# Patient Record
Sex: MALE | Race: WHITE | NOT HISPANIC OR LATINO | Employment: FULL TIME | RURAL
[De-identification: names, ages, dates, MRNs, and addresses within clinical notes are randomized per-mention and may not be internally consistent; named-entity substitution may affect disease eponyms.]

---

## 2017-01-01 ENCOUNTER — OFFICE VISIT (OUTPATIENT)
Dept: RETAIL CLINIC | Facility: CLINIC | Age: 67
End: 2017-01-01

## 2017-01-01 VITALS
RESPIRATION RATE: 20 BRPM | WEIGHT: 241.6 LBS | SYSTOLIC BLOOD PRESSURE: 140 MMHG | BODY MASS INDEX: 37.92 KG/M2 | HEIGHT: 67 IN | TEMPERATURE: 99 F | DIASTOLIC BLOOD PRESSURE: 80 MMHG | HEART RATE: 99 BPM | OXYGEN SATURATION: 96 %

## 2017-01-01 DIAGNOSIS — J40 BRONCHITIS: Primary | ICD-10-CM

## 2017-01-01 PROCEDURE — 99213 OFFICE O/P EST LOW 20 MIN: CPT | Performed by: NURSE PRACTITIONER

## 2017-01-01 RX ORDER — HYDRALAZINE HYDROCHLORIDE 50 MG/1
50 TABLET, FILM COATED ORAL 3 TIMES DAILY
COMMUNITY

## 2017-01-01 RX ORDER — TRAZODONE HYDROCHLORIDE 50 MG/1
50 TABLET ORAL NIGHTLY
COMMUNITY

## 2017-01-01 RX ORDER — AMLODIPINE BESYLATE 10 MG/1
10 TABLET ORAL DAILY
COMMUNITY
End: 2017-07-22

## 2017-01-01 RX ORDER — FINASTERIDE 5 MG/1
5 TABLET, FILM COATED ORAL
COMMUNITY
End: 2017-07-22

## 2017-01-01 RX ORDER — CARVEDILOL 25 MG/1
25 TABLET ORAL 2 TIMES DAILY WITH MEALS
COMMUNITY

## 2017-01-01 RX ORDER — CLONIDINE HYDROCHLORIDE 0.1 MG/1
0.2 TABLET ORAL 2 TIMES DAILY
COMMUNITY
End: 2018-01-22

## 2017-01-01 RX ORDER — METHYLPREDNISOLONE 4 MG/1
TABLET ORAL
Qty: 21 TABLET | Refills: 0 | Status: SHIPPED | OUTPATIENT
Start: 2017-01-01 | End: 2017-07-22

## 2017-01-01 RX ORDER — TAMSULOSIN HYDROCHLORIDE 0.4 MG/1
0.4 CAPSULE ORAL
COMMUNITY
End: 2017-07-22

## 2017-01-01 RX ORDER — GABAPENTIN 300 MG/1
300 CAPSULE ORAL 3 TIMES DAILY
COMMUNITY
End: 2018-01-25 | Stop reason: HOSPADM

## 2017-01-01 RX ORDER — POTASSIUM CHLORIDE 750 MG/1
10 TABLET, FILM COATED, EXTENDED RELEASE ORAL DAILY
COMMUNITY
End: 2018-01-25 | Stop reason: HOSPADM

## 2017-01-01 RX ORDER — LISINOPRIL 40 MG/1
20 TABLET ORAL
COMMUNITY

## 2017-01-01 RX ORDER — BUSPIRONE HYDROCHLORIDE 15 MG/1
15 TABLET ORAL
COMMUNITY

## 2017-01-01 RX ORDER — AMOXICILLIN AND CLAVULANATE POTASSIUM 875; 125 MG/1; MG/1
1 TABLET, FILM COATED ORAL 2 TIMES DAILY
Qty: 20 TABLET | Refills: 0 | Status: SHIPPED | OUTPATIENT
Start: 2017-01-01 | End: 2017-01-11

## 2017-01-01 RX ORDER — ATORVASTATIN CALCIUM 20 MG/1
TABLET, FILM COATED ORAL DAILY
Status: ON HOLD | COMMUNITY
End: 2018-01-25

## 2017-01-01 RX ORDER — HYDROCHLOROTHIAZIDE 12.5 MG/1
12.5 CAPSULE, GELATIN COATED ORAL
COMMUNITY
End: 2017-07-22

## 2017-01-01 RX ORDER — FLURAZEPAM HCL 15 MG
15 CAPSULE ORAL DAILY
COMMUNITY
End: 2017-07-22

## 2017-01-01 RX ORDER — LISINOPRIL 40 MG/1
40 TABLET ORAL DAILY
COMMUNITY
End: 2017-07-22

## 2017-01-01 RX ORDER — FUROSEMIDE 20 MG/1
20 TABLET ORAL DAILY
COMMUNITY
End: 2018-01-25 | Stop reason: HOSPADM

## 2017-01-01 RX ORDER — BROMPHENIRAMINE MALEATE, PSEUDOEPHEDRINE HYDROCHLORIDE, AND DEXTROMETHORPHAN HYDROBROMIDE 2; 30; 10 MG/5ML; MG/5ML; MG/5ML
5-10 SYRUP ORAL 4 TIMES DAILY PRN
Qty: 120 ML | Refills: 0 | Status: SHIPPED | OUTPATIENT
Start: 2017-01-01 | End: 2017-07-22

## 2017-01-01 NOTE — PROGRESS NOTES
Subjective   Huber Gomez is a 66 y.o. male.     Cough   This is a new problem. The current episode started in the past 7 days. The problem has been gradually worsening. The problem occurs every few hours. The cough is productive of purulent sputum. Associated symptoms include nasal congestion, postnasal drip and a sore throat. The symptoms are aggravated by exercise. The treatment provided no relief.        The following portions of the patient's history were reviewed and updated as appropriate: allergies, current medications, past family history, past medical history, past social history, past surgical history and problem list.  ...    Review of Systems   Constitutional: Negative.    HENT: Positive for postnasal drip and sore throat.    Eyes: Negative.    Respiratory: Positive for cough.    Cardiovascular: Negative.    Gastrointestinal: Negative.    Endocrine: Negative.    Genitourinary: Negative.    Musculoskeletal: Negative.    Skin: Negative.    Allergic/Immunologic: Negative.    Neurological: Negative.    Hematological: Negative.    Psychiatric/Behavioral: Negative.        Objective   Physical Exam   Constitutional: He is oriented to person, place, and time. He appears well-nourished.   HENT:   Head: Normocephalic and atraumatic.   Right Ear: External ear normal.   Left Ear: External ear normal.   Mouth/Throat: Uvula is midline and mucous membranes are normal. Oropharyngeal exudate and posterior oropharyngeal erythema present. Tonsils are 1+ on the right. Tonsils are 1+ on the left.   Eyes: Conjunctivae are normal. Pupils are equal, round, and reactive to light.   Neck: Neck supple.   Cardiovascular: Normal rate and regular rhythm.    Pulmonary/Chest:   Coarse breath sounds bilaterally   Abdominal: Soft. Bowel sounds are normal.   Musculoskeletal: Normal range of motion.   Neurological: He is alert and oriented to person, place, and time.   Skin: Skin is warm and dry.   Psychiatric: He has a normal mood and  affect. His behavior is normal.   Nursing note and vitals reviewed.      Assessment/Plan   Huber was seen today for cough.    Diagnoses and all orders for this visit:    Bronchitis    Other orders  -     amoxicillin-clavulanate (AUGMENTIN) 875-125 MG per tablet; Take 1 tablet by mouth 2 (Two) Times a Day for 10 days.  -     MethylPREDNISolone (MEDROL, ASHLEY,) 4 MG tablet; Take as directed on package instructions.  -     brompheniramine-pseudoephedrine-DM 30-2-10 MG/5ML syrup; Take 5-10 mL by mouth 4 (Four) Times a Day As Needed for cough.

## 2017-01-01 NOTE — MR AVS SNAPSHOT
Huber CAMILO Jason   1/1/2017 12:15 PM   Office Visit    Dept Phone:  [phone]   Encounter #:  [id number]    Provider:  ZULY MATOS   Department:  [facility]                Your Full Care Plan              Today's Medication Changes          These changes are accurate as of: 1/1/17 12:56 PM.  If you have any questions, ask your nurse or doctor.               New Medication(s)Ordered:     amoxicillin-clavulanate 875-125 MG per tablet   Commonly known as:  AUGMENTIN   Take 1 tablet by mouth 2 (Two) Times a Day for 10 days.       brompheniramine-pseudoephedrine-DM 30-2-10 MG/5ML syrup   Take 5-10 mL by mouth 4 (Four) Times a Day As Needed for cough.       MethylPREDNISolone 4 MG tablet   Commonly known as:  MEDROL (ASHLEY)   Take as directed on package instructions.            Where to Get Your Medications      These medications were sent to [facility] [address] [place] [address] [employer] [facility] - [phone]  - [phone] [facility] [place] [facility] [address]     Phone:  [phone]     amoxicillin-clavulanate 875-125 MG per tablet    brompheniramine-pseudoephedrine-DM 30-2-10 MG/5ML syrup    MethylPREDNISolone 4 MG tablet                  Your Updated Medication List          This list is accurate as of: 1/1/17 12:56 PM.  Always use your most recent med list.                amLODIPine 10 MG tablet   Commonly known as:  NORVASC       amoxicillin-clavulanate 875-125 MG per tablet   Commonly known as:  AUGMENTIN   Take 1 tablet by mouth 2 (Two) Times a Day for 10 days.       ASPIRIN PO       atorvastatin 20 MG tablet   Commonly known as:  LIPITOR       brompheniramine-pseudoephedrine-DM 30-2-10 MG/5ML syrup   Take 5-10 mL by mouth 4 (Four) Times a Day As Needed for cough.       busPIRone 15 MG tablet   Commonly known as:  BUSPAR       carvedilol 25 MG tablet   Commonly known as:  COREG       CloNIDine 0.1 MG tablet   Commonly known as:  CATAPRES       finasteride  5 MG tablet   Commonly known as:  PROSCAR       flurazepam 15 MG capsule   Commonly known as:  DALMANE       furosemide 20 MG tablet   Commonly known as:  LASIX       gabapentin 300 MG capsule   Commonly known as:  NEURONTIN       hydrALAZINE 50 MG tablet   Commonly known as:  APRESOLINE       hydrochlorothiazide 12.5 MG capsule   Commonly known as:  MICROZIDE       HYDROcodone-acetaminophen 7.5-500 MG per tablet   Commonly known as:  VICODIN       * lisinopril 40 MG tablet   Commonly known as:  PRINIVIL,ZESTRIL       * lisinopril 40 MG tablet   Commonly known as:  PRINIVIL,ZESTRIL       MethylPREDNISolone 4 MG tablet   Commonly known as:  MEDROL (ASHLEY)   Take as directed on package instructions.       niacin-simvastatin 500-20 MG per 24 hr tablet   Commonly known as:  SIMCOR       potassium chloride 10 MEQ CR tablet   Commonly known as:  K-DUR       tamsulosin 0.4 MG capsule 24 hr capsule   Commonly known as:  FLOMAX       traZODone 50 MG tablet   Commonly known as:  DESYREL       * Notice:  This list has 2 medication(s) that are the same as other medications prescribed for you. Read the directions carefully, and ask your doctor or other care provider to review them with you.            You Were Diagnosed With        Codes Comments    Bronchitis    -  Primary ICD-10-CM: J40  ICD-9-CM: 490       Instructions    Follow up with family physician failure to improve or worsening condition.     Patient Instructions History      Upcoming Appointments     Visit Type Date Time Department    OFFICE VISIT 1/1/2017 12:15 PM GAURAV POE      [facility] Signup     [facility] allows you to send messages to your doctor, view your test results, renew your prescriptions, schedule appointments, and more. To sign up, go to [employer] and click on the Sign Up Now link in the New User? box. Enter your [employer] Activation Code exactly as it appears below along with the last four digits of your Social Security  "Number and your Date of Birth () to complete the sign-up process. If you do not sign up before the expiration date, you must request a new code.    [employer] Activation Code: [email or web address]  Expires: 1/15/2017 12:56 PM    If you have questions, you can email [email or web address] or call [phone] to talk to our [employer] staff. Remember, [employer] is NOT to be used for urgent needs. For medical emergencies, dial 911.               Other Info from Your Visit           Allergies     No Known Allergies      Reason for Visit     Cough sneezing,sore throat x 3 days      Vital Signs     Blood Pressure Pulse Temperature Respirations Height Weight    140/80 (BP Location: Right arm, Patient Position: Sitting, Cuff Size: Large Adult) 99 99 °F (37.2 °C) (Tympanic) 20 67\" (170.2 cm) 241 lb 9.6 oz (110 kg)    Oxygen Saturation Body Mass Index Smoking Status             96% 37.84 kg/m2 Never Smoker         Problems and Diagnoses Noted     Bronchitis    -  Primary        "

## 2017-02-12 PROBLEM — M54.2 NECK PAIN: Status: ACTIVE | Noted: 2017-02-12

## 2018-01-22 ENCOUNTER — APPOINTMENT (OUTPATIENT)
Dept: GENERAL RADIOLOGY | Facility: HOSPITAL | Age: 68
End: 2018-01-22

## 2018-01-22 ENCOUNTER — HOSPITAL ENCOUNTER (OUTPATIENT)
Facility: HOSPITAL | Age: 68
Setting detail: OBSERVATION
Discharge: HOME | End: 2018-01-25
Attending: FAMILY MEDICINE | Admitting: FAMILY MEDICINE

## 2018-01-22 DIAGNOSIS — R07.2 PRECORDIAL PAIN: Primary | ICD-10-CM

## 2018-01-22 DIAGNOSIS — I10 ESSENTIAL HYPERTENSION: ICD-10-CM

## 2018-01-22 DIAGNOSIS — I10 RESISTANT HYPERTENSION: ICD-10-CM

## 2018-01-22 LAB
ALBUMIN SERPL-MCNC: 4.1 G/DL (ref 3.4–4.8)
ALBUMIN/GLOB SERPL: 1.2 G/DL (ref 1.1–1.8)
ALP SERPL-CCNC: 78 U/L (ref 38–126)
ALT SERPL W P-5'-P-CCNC: 42 U/L (ref 21–72)
ANION GAP SERPL CALCULATED.3IONS-SCNC: 12 MMOL/L (ref 5–15)
AST SERPL-CCNC: 29 U/L (ref 17–59)
BASOPHILS # BLD AUTO: 0.05 10*3/MM3 (ref 0–0.2)
BASOPHILS NFR BLD AUTO: 0.5 % (ref 0–2)
BILIRUB SERPL-MCNC: 0.5 MG/DL (ref 0.2–1.3)
BUN BLD-MCNC: 23 MG/DL (ref 7–21)
BUN/CREAT SERPL: 17.6 (ref 7–25)
CALCIUM SPEC-SCNC: 9.1 MG/DL (ref 8.4–10.2)
CHLORIDE SERPL-SCNC: 103 MMOL/L (ref 95–110)
CK MB SERPL-CCNC: 1.72 NG/ML (ref 0–5)
CK SERPL-CCNC: 91 U/L (ref 55–170)
CO2 SERPL-SCNC: 22 MMOL/L (ref 22–31)
CREAT BLD-MCNC: 1.31 MG/DL (ref 0.7–1.3)
DEPRECATED RDW RBC AUTO: 38.7 FL (ref 35.1–43.9)
EOSINOPHIL # BLD AUTO: 0.21 10*3/MM3 (ref 0–0.7)
EOSINOPHIL NFR BLD AUTO: 2.3 % (ref 0–7)
ERYTHROCYTE [DISTWIDTH] IN BLOOD BY AUTOMATED COUNT: 12.9 % (ref 11.5–14.5)
GFR SERPL CREATININE-BSD FRML MDRD: 55 ML/MIN/1.73 (ref 60–113)
GLOBULIN UR ELPH-MCNC: 3.3 GM/DL (ref 2.3–3.5)
GLUCOSE BLD-MCNC: 115 MG/DL (ref 60–100)
HCT VFR BLD AUTO: 44.2 % (ref 39–49)
HGB BLD-MCNC: 15.3 G/DL (ref 13.7–17.3)
HOLD SPECIMEN: NORMAL
HOLD SPECIMEN: NORMAL
IMM GRANULOCYTES # BLD: 0.05 10*3/MM3 (ref 0–0.02)
IMM GRANULOCYTES NFR BLD: 0.5 % (ref 0–0.5)
INR PPP: 0.9 (ref 0.8–1.2)
LIPASE SERPL-CCNC: 184 U/L (ref 23–300)
LYMPHOCYTES # BLD AUTO: 2.82 10*3/MM3 (ref 0.6–4.2)
LYMPHOCYTES NFR BLD AUTO: 30.5 % (ref 10–50)
MCH RBC QN AUTO: 28.9 PG (ref 26.5–34)
MCHC RBC AUTO-ENTMCNC: 34.6 G/DL (ref 31.5–36.3)
MCV RBC AUTO: 83.6 FL (ref 80–98)
MONOCYTES # BLD AUTO: 0.97 10*3/MM3 (ref 0–0.9)
MONOCYTES NFR BLD AUTO: 10.5 % (ref 0–12)
NEUTROPHILS # BLD AUTO: 5.16 10*3/MM3 (ref 2–8.6)
NEUTROPHILS NFR BLD AUTO: 55.7 % (ref 37–80)
NT-PROBNP SERPL-MCNC: 272 PG/ML (ref 0–900)
PLATELET # BLD AUTO: 232 10*3/MM3 (ref 150–450)
PMV BLD AUTO: 11.1 FL (ref 8–12)
POTASSIUM BLD-SCNC: 3.6 MMOL/L (ref 3.5–5.1)
PROT SERPL-MCNC: 7.4 G/DL (ref 6.3–8.6)
PROTHROMBIN TIME: 12 SECONDS (ref 11.1–15.3)
RBC # BLD AUTO: 5.29 10*6/MM3 (ref 4.37–5.74)
SODIUM BLD-SCNC: 137 MMOL/L (ref 137–145)
TROPONIN I SERPL-MCNC: 0.02 NG/ML
WBC NRBC COR # BLD: 9.26 10*3/MM3 (ref 3.2–9.8)
WHOLE BLOOD HOLD SPECIMEN: NORMAL
WHOLE BLOOD HOLD SPECIMEN: NORMAL

## 2018-01-22 PROCEDURE — 93005 ELECTROCARDIOGRAM TRACING: CPT | Performed by: FAMILY MEDICINE

## 2018-01-22 PROCEDURE — 85025 COMPLETE CBC W/AUTO DIFF WBC: CPT | Performed by: FAMILY MEDICINE

## 2018-01-22 PROCEDURE — 96376 TX/PRO/DX INJ SAME DRUG ADON: CPT

## 2018-01-22 PROCEDURE — 25010000002 KETOROLAC TROMETHAMINE PER 15 MG: Performed by: FAMILY MEDICINE

## 2018-01-22 PROCEDURE — 96374 THER/PROPH/DIAG INJ IV PUSH: CPT

## 2018-01-22 PROCEDURE — 83880 ASSAY OF NATRIURETIC PEPTIDE: CPT | Performed by: FAMILY MEDICINE

## 2018-01-22 PROCEDURE — 71046 X-RAY EXAM CHEST 2 VIEWS: CPT

## 2018-01-22 PROCEDURE — 83690 ASSAY OF LIPASE: CPT | Performed by: FAMILY MEDICINE

## 2018-01-22 PROCEDURE — 80053 COMPREHEN METABOLIC PANEL: CPT | Performed by: FAMILY MEDICINE

## 2018-01-22 PROCEDURE — 82553 CREATINE MB FRACTION: CPT | Performed by: FAMILY MEDICINE

## 2018-01-22 PROCEDURE — 99285 EMERGENCY DEPT VISIT HI MDM: CPT

## 2018-01-22 PROCEDURE — 85610 PROTHROMBIN TIME: CPT | Performed by: FAMILY MEDICINE

## 2018-01-22 PROCEDURE — 84484 ASSAY OF TROPONIN QUANT: CPT | Performed by: FAMILY MEDICINE

## 2018-01-22 PROCEDURE — 96375 TX/PRO/DX INJ NEW DRUG ADDON: CPT

## 2018-01-22 PROCEDURE — G0378 HOSPITAL OBSERVATION PER HR: HCPCS

## 2018-01-22 PROCEDURE — 93010 ELECTROCARDIOGRAM REPORT: CPT | Performed by: INTERNAL MEDICINE

## 2018-01-22 PROCEDURE — 25010000002 HYDRALAZINE PER 20 MG: Performed by: FAMILY MEDICINE

## 2018-01-22 PROCEDURE — 82550 ASSAY OF CK (CPK): CPT | Performed by: FAMILY MEDICINE

## 2018-01-22 RX ORDER — HYDRALAZINE HYDROCHLORIDE 20 MG/ML
20 INJECTION INTRAMUSCULAR; INTRAVENOUS ONCE
Status: COMPLETED | OUTPATIENT
Start: 2018-01-22 | End: 2018-01-22

## 2018-01-22 RX ORDER — KETOROLAC TROMETHAMINE 30 MG/ML
30 INJECTION, SOLUTION INTRAMUSCULAR; INTRAVENOUS ONCE
Status: COMPLETED | OUTPATIENT
Start: 2018-01-22 | End: 2018-01-22

## 2018-01-22 RX ORDER — ASPIRIN 325 MG
325 TABLET ORAL ONCE
Status: COMPLETED | OUTPATIENT
Start: 2018-01-22 | End: 2018-01-22

## 2018-01-22 RX ORDER — SODIUM CHLORIDE 0.9 % (FLUSH) 0.9 %
10 SYRINGE (ML) INJECTION AS NEEDED
Status: DISCONTINUED | OUTPATIENT
Start: 2018-01-22 | End: 2018-01-25 | Stop reason: HOSPADM

## 2018-01-22 RX ADMIN — HYDRALAZINE HYDROCHLORIDE 20 MG: 20 INJECTION INTRAMUSCULAR; INTRAVENOUS at 23:30

## 2018-01-22 RX ADMIN — KETOROLAC TROMETHAMINE 30 MG: 30 INJECTION, SOLUTION INTRAMUSCULAR; INTRAVENOUS at 22:46

## 2018-01-22 RX ADMIN — HYDRALAZINE HYDROCHLORIDE 20 MG: 20 INJECTION INTRAMUSCULAR; INTRAVENOUS at 21:25

## 2018-01-22 RX ADMIN — Medication 10 ML: at 22:47

## 2018-01-22 RX ADMIN — Medication 10 ML: at 21:42

## 2018-01-22 RX ADMIN — ASPIRIN 325 MG: 325 TABLET, COATED ORAL at 21:39

## 2018-01-23 ENCOUNTER — APPOINTMENT (OUTPATIENT)
Dept: CARDIOLOGY | Facility: HOSPITAL | Age: 68
End: 2018-01-23
Attending: FAMILY MEDICINE

## 2018-01-23 PROBLEM — R00.1 SINUS BRADYCARDIA: Status: ACTIVE | Noted: 2018-01-23

## 2018-01-23 PROBLEM — I10 RESISTANT HYPERTENSION: Status: ACTIVE | Noted: 2018-01-23

## 2018-01-23 PROBLEM — I10 HYPERTENSION: Chronic | Status: ACTIVE | Noted: 2018-01-23

## 2018-01-23 LAB
ANION GAP SERPL CALCULATED.3IONS-SCNC: 9 MMOL/L (ref 5–15)
BASOPHILS # BLD AUTO: 0.02 10*3/MM3 (ref 0–0.2)
BASOPHILS NFR BLD AUTO: 0.2 % (ref 0–2)
BH CV ECHO MEAS - ACS: 2.1 CM
BH CV ECHO MEAS - AO ISTHMUS: 2.3 CM
BH CV ECHO MEAS - AO MAX PG (FULL): 3.1 MMHG
BH CV ECHO MEAS - AO MAX PG: 10.8 MMHG
BH CV ECHO MEAS - AO MEAN PG (FULL): 1.3 MMHG
BH CV ECHO MEAS - AO MEAN PG: 6.3 MMHG
BH CV ECHO MEAS - AO ROOT AREA (BSA CORRECTED): 1.3
BH CV ECHO MEAS - AO ROOT AREA: 7.4 CM^2
BH CV ECHO MEAS - AO ROOT DIAM: 3.1 CM
BH CV ECHO MEAS - AO V2 MAX: 164.1 CM/SEC
BH CV ECHO MEAS - AO V2 MEAN: 119.3 CM/SEC
BH CV ECHO MEAS - AO V2 VTI: 34 CM
BH CV ECHO MEAS - ASC AORTA: 2.8 CM
BH CV ECHO MEAS - AVA(I,A): 3.8 CM^2
BH CV ECHO MEAS - AVA(I,D): 3.8 CM^2
BH CV ECHO MEAS - AVA(V,A): 3.3 CM^2
BH CV ECHO MEAS - AVA(V,D): 3.3 CM^2
BH CV ECHO MEAS - BSA(HAYCOCK): 2.4 M^2
BH CV ECHO MEAS - BSA: 2.3 M^2
BH CV ECHO MEAS - BZI_BMI: 41.7 KILOGRAMS/M^2
BH CV ECHO MEAS - BZI_METRIC_HEIGHT: 170.2 CM
BH CV ECHO MEAS - BZI_METRIC_WEIGHT: 120.7 KG
BH CV ECHO MEAS - EDV(CUBED): 122.9 ML
BH CV ECHO MEAS - EDV(TEICH): 116.7 ML
BH CV ECHO MEAS - EF(CUBED): 79.8 %
BH CV ECHO MEAS - EF(TEICH): 72 %
BH CV ECHO MEAS - ESV(CUBED): 24.8 ML
BH CV ECHO MEAS - ESV(TEICH): 32.7 ML
BH CV ECHO MEAS - FS: 41.3 %
BH CV ECHO MEAS - IVS/LVPW: 1.1
BH CV ECHO MEAS - IVSD: 1.1 CM
BH CV ECHO MEAS - LA DIMENSION: 4.2 CM
BH CV ECHO MEAS - LA/AO: 1.4
BH CV ECHO MEAS - LV MASS(C)D: 197.5 GRAMS
BH CV ECHO MEAS - LV MASS(C)DI: 86.5 GRAMS/M^2
BH CV ECHO MEAS - LV MAX PG: 7.6 MMHG
BH CV ECHO MEAS - LV MEAN PG: 4.9 MMHG
BH CV ECHO MEAS - LV V1 MAX: 138.2 CM/SEC
BH CV ECHO MEAS - LV V1 MEAN: 105.6 CM/SEC
BH CV ECHO MEAS - LV V1 VTI: 33.5 CM
BH CV ECHO MEAS - LVIDD: 5 CM
BH CV ECHO MEAS - LVIDS: 2.9 CM
BH CV ECHO MEAS - LVOT AREA (M): 3.8 CM^2
BH CV ECHO MEAS - LVOT AREA: 3.9 CM^2
BH CV ECHO MEAS - LVOT DIAM: 2.2 CM
BH CV ECHO MEAS - LVPWD: 1 CM
BH CV ECHO MEAS - MR MAX PG: 7.8 MMHG
BH CV ECHO MEAS - MR MAX VEL: 139.6 CM/SEC
BH CV ECHO MEAS - MV A MAX VEL: 74.2 CM/SEC
BH CV ECHO MEAS - MV DEC SLOPE: 220 CM/SEC^2
BH CV ECHO MEAS - MV E MAX VEL: 68.4 CM/SEC
BH CV ECHO MEAS - MV E/A: 0.92
BH CV ECHO MEAS - MV MAX PG: 3.1 MMHG
BH CV ECHO MEAS - MV MEAN PG: 1 MMHG
BH CV ECHO MEAS - MV P1/2T MAX VEL: 89.2 CM/SEC
BH CV ECHO MEAS - MV P1/2T: 118.8 MSEC
BH CV ECHO MEAS - MV V2 MAX: 88.2 CM/SEC
BH CV ECHO MEAS - MV V2 MEAN: 46.6 CM/SEC
BH CV ECHO MEAS - MV V2 VTI: 33.5 CM
BH CV ECHO MEAS - MVA P1/2T LCG: 2.5 CM^2
BH CV ECHO MEAS - MVA(P1/2T): 1.9 CM^2
BH CV ECHO MEAS - MVA(VTI): 3.9 CM^2
BH CV ECHO MEAS - PA MAX PG: 3.5 MMHG
BH CV ECHO MEAS - PA V2 MAX: 93.1 CM/SEC
BH CV ECHO MEAS - PI END-D VEL: 66.9 CM/SEC
BH CV ECHO MEAS - RVDD: 2.6 CM
BH CV ECHO MEAS - SI(AO): 110.8 ML/M^2
BH CV ECHO MEAS - SI(CUBED): 43 ML/M^2
BH CV ECHO MEAS - SI(LVOT): 57.1 ML/M^2
BH CV ECHO MEAS - SI(TEICH): 36.8 ML/M^2
BH CV ECHO MEAS - SV(AO): 252.9 ML
BH CV ECHO MEAS - SV(CUBED): 98.1 ML
BH CV ECHO MEAS - SV(LVOT): 130.3 ML
BH CV ECHO MEAS - SV(TEICH): 84 ML
BH CV ECHO MEAS - TR MAX VEL: 171.3 CM/SEC
BUN BLD-MCNC: 21 MG/DL (ref 7–21)
BUN/CREAT SERPL: 17.4 (ref 7–25)
CALCIUM SPEC-SCNC: 9.2 MG/DL (ref 8.4–10.2)
CHLORIDE SERPL-SCNC: 104 MMOL/L (ref 95–110)
CO2 SERPL-SCNC: 24 MMOL/L (ref 22–31)
CREAT BLD-MCNC: 1.21 MG/DL (ref 0.7–1.3)
DEPRECATED RDW RBC AUTO: 39 FL (ref 35.1–43.9)
EOSINOPHIL # BLD AUTO: 0.07 10*3/MM3 (ref 0–0.7)
EOSINOPHIL NFR BLD AUTO: 0.6 % (ref 0–7)
ERYTHROCYTE [DISTWIDTH] IN BLOOD BY AUTOMATED COUNT: 12.9 % (ref 11.5–14.5)
GFR SERPL CREATININE-BSD FRML MDRD: 60 ML/MIN/1.73 (ref 49–113)
GLUCOSE BLD-MCNC: 144 MG/DL (ref 60–100)
HCT VFR BLD AUTO: 42.8 % (ref 39–49)
HGB BLD-MCNC: 14.5 G/DL (ref 13.7–17.3)
HOLD SPECIMEN: NORMAL
IMM GRANULOCYTES # BLD: 0.05 10*3/MM3 (ref 0–0.02)
IMM GRANULOCYTES NFR BLD: 0.5 % (ref 0–0.5)
LV EF 2D ECHO EST: 65 %
LYMPHOCYTES # BLD AUTO: 1.56 10*3/MM3 (ref 0.6–4.2)
LYMPHOCYTES NFR BLD AUTO: 14.1 % (ref 10–50)
MCH RBC QN AUTO: 28.3 PG (ref 26.5–34)
MCHC RBC AUTO-ENTMCNC: 33.9 G/DL (ref 31.5–36.3)
MCV RBC AUTO: 83.6 FL (ref 80–98)
MONOCYTES # BLD AUTO: 0.86 10*3/MM3 (ref 0–0.9)
MONOCYTES NFR BLD AUTO: 7.7 % (ref 0–12)
NEUTROPHILS # BLD AUTO: 8.54 10*3/MM3 (ref 2–8.6)
NEUTROPHILS NFR BLD AUTO: 76.9 % (ref 37–80)
PLATELET # BLD AUTO: 234 10*3/MM3 (ref 150–450)
PMV BLD AUTO: 10.9 FL (ref 8–12)
POTASSIUM BLD-SCNC: 3.6 MMOL/L (ref 3.5–5.1)
RBC # BLD AUTO: 5.12 10*6/MM3 (ref 4.37–5.74)
SODIUM BLD-SCNC: 137 MMOL/L (ref 137–145)
TROPONIN I SERPL-MCNC: 0.02 NG/ML
WBC NRBC COR # BLD: 11.1 10*3/MM3 (ref 3.2–9.8)

## 2018-01-23 PROCEDURE — G0378 HOSPITAL OBSERVATION PER HR: HCPCS

## 2018-01-23 PROCEDURE — 25010000002 ONDANSETRON PER 1 MG: Performed by: FAMILY MEDICINE

## 2018-01-23 PROCEDURE — 85025 COMPLETE CBC W/AUTO DIFF WBC: CPT | Performed by: FAMILY MEDICINE

## 2018-01-23 PROCEDURE — 84484 ASSAY OF TROPONIN QUANT: CPT | Performed by: FAMILY MEDICINE

## 2018-01-23 PROCEDURE — 80048 BASIC METABOLIC PNL TOTAL CA: CPT | Performed by: FAMILY MEDICINE

## 2018-01-23 PROCEDURE — 93306 TTE W/DOPPLER COMPLETE: CPT | Performed by: INTERNAL MEDICINE

## 2018-01-23 PROCEDURE — 94799 UNLISTED PULMONARY SVC/PX: CPT

## 2018-01-23 PROCEDURE — 93306 TTE W/DOPPLER COMPLETE: CPT

## 2018-01-23 PROCEDURE — 94760 N-INVAS EAR/PLS OXIMETRY 1: CPT

## 2018-01-23 PROCEDURE — 96375 TX/PRO/DX INJ NEW DRUG ADDON: CPT

## 2018-01-23 RX ORDER — GABAPENTIN 300 MG/1
300 CAPSULE ORAL 3 TIMES DAILY
Status: DISCONTINUED | OUTPATIENT
Start: 2018-01-23 | End: 2018-01-23

## 2018-01-23 RX ORDER — HYDROCHLOROTHIAZIDE 25 MG/1
50 TABLET ORAL DAILY
Status: DISCONTINUED | OUTPATIENT
Start: 2018-01-23 | End: 2018-01-23

## 2018-01-23 RX ORDER — ATORVASTATIN CALCIUM 20 MG/1
20 TABLET, FILM COATED ORAL DAILY
Status: DISCONTINUED | OUTPATIENT
Start: 2018-01-23 | End: 2018-01-23

## 2018-01-23 RX ORDER — HYDRALAZINE HYDROCHLORIDE 20 MG/ML
5 INJECTION INTRAMUSCULAR; INTRAVENOUS EVERY 6 HOURS PRN
Status: DISCONTINUED | OUTPATIENT
Start: 2018-01-23 | End: 2018-01-25 | Stop reason: HOSPADM

## 2018-01-23 RX ORDER — FUROSEMIDE 20 MG/1
20 TABLET ORAL DAILY
Status: DISCONTINUED | OUTPATIENT
Start: 2018-01-23 | End: 2018-01-23

## 2018-01-23 RX ORDER — HYDRALAZINE HYDROCHLORIDE 50 MG/1
50 TABLET, FILM COATED ORAL 3 TIMES DAILY
Status: DISCONTINUED | OUTPATIENT
Start: 2018-01-23 | End: 2018-01-23

## 2018-01-23 RX ORDER — CLONIDINE HYDROCHLORIDE 0.2 MG/1
0.2 TABLET ORAL EVERY 8 HOURS SCHEDULED
Status: DISCONTINUED | OUTPATIENT
Start: 2018-01-23 | End: 2018-01-25 | Stop reason: HOSPADM

## 2018-01-23 RX ORDER — ACETAMINOPHEN 325 MG/1
650 TABLET ORAL EVERY 4 HOURS PRN
Status: DISCONTINUED | OUTPATIENT
Start: 2018-01-23 | End: 2018-01-25 | Stop reason: HOSPADM

## 2018-01-23 RX ORDER — LISINOPRIL 40 MG/1
40 TABLET ORAL
Status: DISCONTINUED | OUTPATIENT
Start: 2018-01-23 | End: 2018-01-25 | Stop reason: HOSPADM

## 2018-01-23 RX ORDER — TRAZODONE HYDROCHLORIDE 50 MG/1
50 TABLET ORAL NIGHTLY
Status: DISCONTINUED | OUTPATIENT
Start: 2018-01-23 | End: 2018-01-25 | Stop reason: HOSPADM

## 2018-01-23 RX ORDER — ONDANSETRON 2 MG/ML
4 INJECTION INTRAMUSCULAR; INTRAVENOUS ONCE
Status: COMPLETED | OUTPATIENT
Start: 2018-01-23 | End: 2018-01-23

## 2018-01-23 RX ORDER — ASPIRIN 81 MG/1
81 TABLET, CHEWABLE ORAL DAILY
Status: DISCONTINUED | OUTPATIENT
Start: 2018-01-23 | End: 2018-01-25 | Stop reason: HOSPADM

## 2018-01-23 RX ORDER — ONDANSETRON 2 MG/ML
INJECTION INTRAMUSCULAR; INTRAVENOUS
Status: DISCONTINUED
Start: 2018-01-23 | End: 2018-01-25 | Stop reason: HOSPADM

## 2018-01-23 RX ORDER — SODIUM CHLORIDE 0.9 % (FLUSH) 0.9 %
1-10 SYRINGE (ML) INJECTION AS NEEDED
Status: DISCONTINUED | OUTPATIENT
Start: 2018-01-23 | End: 2018-01-25 | Stop reason: HOSPADM

## 2018-01-23 RX ORDER — DOCUSATE SODIUM 100 MG/1
100 CAPSULE, LIQUID FILLED ORAL 2 TIMES DAILY
Status: DISCONTINUED | OUTPATIENT
Start: 2018-01-23 | End: 2018-01-25 | Stop reason: HOSPADM

## 2018-01-23 RX ORDER — CARVEDILOL 25 MG/1
25 TABLET ORAL 2 TIMES DAILY WITH MEALS
Status: DISCONTINUED | OUTPATIENT
Start: 2018-01-23 | End: 2018-01-25 | Stop reason: HOSPADM

## 2018-01-23 RX ORDER — BUSPIRONE HYDROCHLORIDE 15 MG/1
15 TABLET ORAL EVERY 12 HOURS SCHEDULED
Status: DISCONTINUED | OUTPATIENT
Start: 2018-01-23 | End: 2018-01-25 | Stop reason: HOSPADM

## 2018-01-23 RX ORDER — MORPHINE SULFATE 2 MG/ML
1 INJECTION, SOLUTION INTRAMUSCULAR; INTRAVENOUS EVERY 4 HOURS PRN
Status: DISCONTINUED | OUTPATIENT
Start: 2018-01-23 | End: 2018-01-25 | Stop reason: HOSPADM

## 2018-01-23 RX ORDER — NALOXONE HCL 0.4 MG/ML
0.4 VIAL (ML) INJECTION
Status: DISCONTINUED | OUTPATIENT
Start: 2018-01-23 | End: 2018-01-25 | Stop reason: HOSPADM

## 2018-01-23 RX ORDER — FAMOTIDINE 40 MG/1
40 TABLET, FILM COATED ORAL DAILY
Status: DISCONTINUED | OUTPATIENT
Start: 2018-01-23 | End: 2018-01-25 | Stop reason: HOSPADM

## 2018-01-23 RX ORDER — ONDANSETRON 2 MG/ML
4 INJECTION INTRAMUSCULAR; INTRAVENOUS EVERY 6 HOURS PRN
Status: DISCONTINUED | OUTPATIENT
Start: 2018-01-23 | End: 2018-01-25 | Stop reason: HOSPADM

## 2018-01-23 RX ADMIN — CLONIDINE HYDROCHLORIDE 0.2 MG: 0.2 TABLET ORAL at 21:01

## 2018-01-23 RX ADMIN — HYDRALAZINE HYDROCHLORIDE 75 MG: 50 TABLET ORAL at 17:49

## 2018-01-23 RX ADMIN — ACETAMINOPHEN 650 MG: 325 TABLET ORAL at 04:20

## 2018-01-23 RX ADMIN — LISINOPRIL 40 MG: 40 TABLET ORAL at 08:03

## 2018-01-23 RX ADMIN — HYDRALAZINE HYDROCHLORIDE 75 MG: 50 TABLET ORAL at 13:14

## 2018-01-23 RX ADMIN — CLONIDINE HYDROCHLORIDE 0.2 MG: 0.2 TABLET ORAL at 13:17

## 2018-01-23 RX ADMIN — FUROSEMIDE 20 MG: 20 TABLET ORAL at 08:02

## 2018-01-23 RX ADMIN — HYDROCHLOROTHIAZIDE 50 MG: 25 TABLET ORAL at 08:03

## 2018-01-23 RX ADMIN — ONDANSETRON 4 MG: 2 INJECTION INTRAMUSCULAR; INTRAVENOUS at 00:20

## 2018-01-23 RX ADMIN — CARVEDILOL 25 MG: 25 TABLET, FILM COATED ORAL at 08:02

## 2018-01-23 RX ADMIN — CLONIDINE HYDROCHLORIDE 0.2 MG: 0.2 TABLET ORAL at 06:13

## 2018-01-23 RX ADMIN — ATORVASTATIN CALCIUM 20 MG: 20 TABLET, FILM COATED ORAL at 08:02

## 2018-01-23 RX ADMIN — DOCUSATE SODIUM 100 MG: 100 CAPSULE, LIQUID FILLED ORAL at 08:02

## 2018-01-23 RX ADMIN — TRAZODONE HYDROCHLORIDE 50 MG: 50 TABLET ORAL at 23:18

## 2018-01-23 RX ADMIN — FAMOTIDINE 40 MG: 40 TABLET ORAL at 08:03

## 2018-01-23 RX ADMIN — BUSPIRONE HYDROCHLORIDE 15 MG: 15 TABLET ORAL at 21:01

## 2018-01-23 RX ADMIN — DOCUSATE SODIUM 100 MG: 100 CAPSULE, LIQUID FILLED ORAL at 21:03

## 2018-01-23 RX ADMIN — BUSPIRONE HYDROCHLORIDE 15 MG: 15 TABLET ORAL at 08:02

## 2018-01-23 RX ADMIN — HYDRALAZINE HYDROCHLORIDE 75 MG: 50 TABLET ORAL at 21:00

## 2018-01-23 RX ADMIN — HYDRALAZINE HYDROCHLORIDE 50 MG: 50 TABLET ORAL at 08:02

## 2018-01-23 RX ADMIN — ASPIRIN 81 MG 81 MG: 81 TABLET ORAL at 08:02

## 2018-01-23 RX ADMIN — CARVEDILOL 25 MG: 25 TABLET, FILM COATED ORAL at 17:49

## 2018-01-23 NOTE — H&P
[facility] Admission      Date of Admission: 1/22/2018      Primary Care Physician: Justine Tirado MD      Chief Complaint   Patient presents with   • Hypertension       HPI:  Patient is a 67 years old male with PMH of HTN complains of uncontrolled BP at home for past 2 days. Patient states that he had similar problems in the past where he had to be hospitalized before. Patient is on multiple home medications. Patient felt some chest pressure on rest yesterday for a minute that resolved .Patient is currently chest pain free.  Patient denies fever or chills, headache , dizziness, visual changes, change in appetite , chest pain or palpitations, difficulty in breathing or cough, abdominal pain , N/V/D , blood in the stool or urine or urinary symptoms, weakness numbness or tingling in the extremities.    Past Medical History:   Past Medical History:   Diagnosis Date   • Hypertension        Past Surgical History: No past surgical history on file.    Family History: No family history on file.    Social History:   Social History     Social History   • Marital status:      Spouse name: N/A   • Number of children: N/A   • Years of education: N/A     Social History Main Topics   • Smoking status: Never Smoker   • Smokeless tobacco: Never Used   • Alcohol use No   • Drug use: No   • Sexual activity: Defer     Other Topics Concern   • Not on file     Social History Narrative       Allergies: No Known Allergies    Medications:   Prior to Admission medications    Medication Sig Start Date End Date Taking? Authorizing Provider   alfuzosin (UROXATRAL) 10 MG 24 hr tablet Take 10 mg by mouth Daily.    Nurse [facility], RN   ASPIRIN PO Take 81 mg by mouth.    Historical Provider, MD   atorvastatin (LIPITOR) 20 MG tablet Take  by mouth Daily.    Historical Provider, MD   busPIRone (BUSPAR) 15 MG tablet Take 15 mg by mouth every night at bedtime.    Historical  Provider, MD   carvedilol (COREG) 25 MG tablet Take 25 mg by mouth 2 (Two) Times a Day With Meals.    Historical Provider, MD   cholecalciferol (VITAMIN D3) 1000 units tablet Take 1,000 Units by mouth Daily.    Nurse [facility], RN   CloNIDine (CATAPRES) 0.2 MG tablet  12/21/17   Nurse Paige Real, RN   coenzyme Q10 100 MG capsule Take 100 mg by mouth Daily.    Nurse Paige Real RN   furosemide (LASIX) 20 MG tablet Take 20 mg by mouth Daily.    Historical Provider, MD   gabapentin (NEURONTIN) 300 MG capsule Take 300 mg by mouth 3 (Three) Times a Day.    Historical Provider, MD   hydrALAZINE (APRESOLINE) 50 MG tablet Take 50 mg by mouth 3 (Three) Times a Day.    Historical Provider, MD   hydrochlorothiazide (HYDRODIURIL) 50 MG tablet Take 50 mg by mouth Daily.    Nurse Paige Real RN   lisinopril (PRINIVIL,ZESTRIL) 40 MG tablet Take 20 mg by mouth.    Historical Provider, MD   magnesium citrate 1.745 GM/30ML solution solution Take 325 mL by mouth 2 (Two) Times a Day.    Nurse Paige Real RN   Mirabegron ER (MYRBETRIQ) 50 MG tablet sustained-release 24 hour 24 hr tablet Take 50 mg by mouth Daily.    Nurse [facility], RN   potassium chloride (K-DUR) 10 MEQ CR tablet Take 10 mEq by mouth Daily.    Historical Provider, MD   traZODone (DESYREL) 50 MG tablet Take 50 mg by mouth Every Night.    Historical Provider, MD   CloNIDine (CATAPRES) 0.1 MG tablet Take 0.2 mg by mouth 2 (Two) Times a Day.  1/22/18  Historical Provider, MD       Review of Systems:    -Otherwise complete ROS is negative except as mentioned above.    Physical Exam:    Physical Exam     Temp:  [97.7 °F (36.5 °C)-98.5 °F (36.9 °C)] 98 °F (36.7 °C)  Heart Rate:  [44-71] 59  Resp:  [18-20] 20  BP: (165-249)/() 198/107    -General:Patient is oriented to person, place, and time. Patient appears well-developed and well-nourished. No distress.   -HEENT: Head: Normocephalic and atraumatic. Right Ear: External ear normal. Left Ear:  External ear normal. Nose: Nose normal. Mouth/Throat: Oropharynx is clear and moist.   Eyes: Conjunctivae and EOM are normal. Pupils are equal, round, and reactive to light. Right eye exhibits no discharge. Left eye exhibits no discharge.   Neck: Normal range of motion. Neck supple. No JVD present. No tracheal deviation present. No thyromegaly present.   -CVS: Normal rate, regular rhythm, normal heart sounds and intact distal pulses.  Exam reveals no gallop and no friction rub.    No murmur heard.  -Pulmonary: Effort normal and breath sounds normal. No stridor. No respiratory distress. He has no wheezes. No rales or tenderness.   -Abdominal: Soft, Bowel sounds are normal. No distension and no mass. There is no tenderness. There is no rebound and no guarding. No hernia.   -Musc: No Cyanosis clubbing or edema.  -Lymph: No cervical adenopathy.   -Neuro: patient is alert and oriented to person, place, and time.Patient has normal reflexes. No cranial nerve deficit. Patient exhibits normal muscle tone , strength and sensation bilaterally. Coordination normal.   -Skin: Skin is warm. No rash noted. Patient is not diaphoretic. No erythema. No pallor.   -Psych: Patient  has a normal mood and affect. behavior is normal. Judgment and thought content normal. Denies suicidal ideations or plans.    Nursing note and vitals reviewed.    Results Reviewed:  I have personally reviewed current lab, radiology, and data and agree with results.  Lab Results (last 24 hours)     Procedure Component Value Units Date/Time    CBC & Differential [[id number]] Collected:  01/22/18 2038    Specimen:  Blood Updated:  01/22/18 2129    Narrative:       The following orders were created for panel order CBC & Differential.  Procedure                               Abnormality         Status                     ---------                               -----------         ------                     CBC Auto Differential[[id number]]        Abnormal             Final result                 Please view results for these tests on the individual orders.    CBC Auto Differential [[id number]]  (Abnormal) Collected:  01/22/18 2038    Specimen:  Blood Updated:  01/22/18 2129     WBC 9.26 10*3/mm3      RBC 5.29 10*6/mm3      Hemoglobin 15.3 g/dL      Hematocrit 44.2 %      MCV 83.6 fL      MCH 28.9 pg      MCHC 34.6 g/dL      RDW 12.9 %      RDW-SD 38.7 fl      MPV 11.1 fL      Platelets 232 10*3/mm3      Neutrophil % 55.7 %      Lymphocyte % 30.5 %      Monocyte % 10.5 %      Eosinophil % 2.3 %      Basophil % 0.5 %      Immature Grans % 0.5 %      Neutrophils, Absolute 5.16 10*3/mm3      Lymphocytes, Absolute 2.82 10*3/mm3      Monocytes, Absolute 0.97 (H) 10*3/mm3      Eosinophils, Absolute 0.21 10*3/mm3      Basophils, Absolute 0.05 10*3/mm3      Immature Grans, Absolute 0.05 (H) 10*3/mm3     Protime-INR [[id number]]  (Normal) Collected:  01/22/18 2038    Specimen:  Blood Updated:  01/22/18 2131     Protime 12.0 Seconds      INR 0.90    Narrative:       Therapeutic range for most indications is 2.0-3.0 INR,  or 2.5-3.5 for mechanical heart valves.    CK [[id number]]  (Normal) Collected:  01/22/18 2038    Specimen:  Blood Updated:  01/22/18 2135     Creatine Kinase 91 U/L     Lipase [[id number]]  (Normal) Collected:  01/22/18 2038    Specimen:  Blood Updated:  01/22/18 2135     Lipase 184 U/L     Comprehensive Metabolic Panel [[id number]]  (Abnormal) Collected:  01/22/18 2038    Specimen:  Blood Updated:  01/22/18 2137     Glucose 115 (H) mg/dL      BUN 23 (H) mg/dL      Creatinine 1.31 (H) mg/dL      Sodium 137 mmol/L      Potassium 3.6 mmol/L      Chloride 103 mmol/L      CO2 22.0 mmol/L      Calcium 9.1 mg/dL      Total Protein 7.4 g/dL      Albumin 4.10 g/dL      ALT (SGPT) 42 U/L      AST (SGOT) 29 U/L      Alkaline Phosphatase 78 U/L      Total Bilirubin 0.5 mg/dL      eGFR Non African Amer 55 (L) mL/min/1.73      Globulin 3.3 gm/dL      A/G Ratio 1.2 g/dL      BUN/Creatinine  Ratio 17.6     Anion Gap 12.0 mmol/L     [facility] Draw [[id number]] Collected:  01/22/18 2038    Specimen:  Blood Updated:  01/22/18 2146    Narrative:       The following orders were created for panel order [facility] Draw.  Procedure                               Abnormality         Status                     ---------                               -----------         ------                     Light Blue Top[[id number]]                                   Final result               Green Top (Gel)[[id number]]                                  Final result               Lavender Top[[id number]]                                     Final result               Gold Top - SST[[id number]]                                   Final result                 Please view results for these tests on the individual orders.    Light Blue Top [[id number]] Collected:  01/22/18 2038    Specimen:  Blood Updated:  01/22/18 2146     Extra Tube hold for add-on      Auto resulted       Green Top (Gel) [[id number]] Collected:  01/22/18 2038    Specimen:  Blood Updated:  01/22/18 2146     Extra Tube Hold for add-ons.      Auto resulted.       Lavender Top [[id number]] Collected:  01/22/18 2038    Specimen:  Blood Updated:  01/22/18 2146     Extra Tube hold for add-on      Auto resulted       Gold Top - SST [[id number]] Collected:  01/22/18 2038    Specimen:  Blood Updated:  01/22/18 2146     Extra Tube Hold for add-ons.      Auto resulted.       Troponin [[id number]]  (Normal) Collected:  01/22/18 2038    Specimen:  Blood Updated:  01/22/18 2147     Troponin I 0.023 ng/mL     BNP [[id number]]  (Normal) Collected:  01/22/18 2038    Specimen:  Blood Updated:  01/22/18 2147     proBNP 272.0 pg/mL     CK-MB [[id number]]  (Normal) Collected:  01/22/18 2038    Specimen:  Blood Updated:  01/22/18 2147     CKMB 1.72 ng/mL         Imaging Results (last 24 hours)     Procedure Component Value Units Date/Time    XR Chest 2 View [[id number]] Collected:  01/22/18 2143      Updated:  01/22/18 2156    Narrative:       Exam: PA lateral chest    INDICATION: Chest pain    COMPARISON: 6/23/2015    FINDINGS: The bony structures are intact. The cardiomediastinal  silhouette is unremarkable. Aorta is tortuous. Lungs are clear.  No pneumothorax or pleural effusion.      Impression:       No acute cardiopulmonary abnormality.    Electronically signed by:  Asim Larsen MD  1/22/2018 9:55 PM  CST Workstation: [id number]          Assessment/Plan           Assessment / Plan  --hypertensive urgency   continue home meds: coreg lisinopril . hctz , hydralazine, lasix, catapres   Hydralazine IV PRN  Tele , close monitoring    --chest pain  Currently chest pain free  Negative trop, no EKG changes  Tele, serial trops. ECHO    --GI/DVT prophylaxis    I discussed the patients findings and my recommendations with the patient, and the patient verbalized understanding of the plan, risks and benefits of the plan.    This document has been electronically signed by Percy Nunez MD on January 22, 2018 11:14 PM

## 2018-01-23 NOTE — PROGRESS NOTES
MEDICINE DAILY PROGRESS NOTE  NAME: Huber Gomez  : 1950  MRN: [id number]     LOS: 0 days     PROVIDER OF SERVICE: Gerber Hess MD    Chief Complaint: Precordial pain    Subjective:     Interval History:  History taken from: patient chart RN  . Chest pain free at time of exam.  No acute events overnight.    Review of Systems:   Review of Systems   Constitutional: Positive for fatigue. Negative for activity change, appetite change and fever.   HENT: Negative for ear pain and sore throat.    Eyes: Negative for pain and visual disturbance.   Respiratory: Negative for cough and shortness of breath.    Cardiovascular: Positive for chest pain (Resolved.). Negative for palpitations.   Gastrointestinal: Negative for abdominal pain and nausea.   Endocrine: Negative for cold intolerance and heat intolerance.   Genitourinary: Negative for difficulty urinating and dysuria.   Musculoskeletal: Negative for arthralgias and gait problem.   Skin: Negative for color change and rash.   Neurological: Negative for dizziness, weakness and headaches.   Hematological: Negative for adenopathy. Does not bruise/bleed easily.   Psychiatric/Behavioral: Negative for agitation, confusion and sleep disturbance.       Objective:     Vital Signs  Vitals:    18 1144 18 1200 18 1526 18 1700   BP: 163/82   170/84   BP Location: Left arm   Right arm   Patient Position: Sitting   Lying   Pulse: 53 59 (!) 47 62   Resp: 18 18  20   Temp: 97.9 °F (36.6 °C)   98.3 °F (36.8 °C)   TempSrc: Temporal Artery    Temporal Artery    SpO2: 96% 96%  93%   Weight:       Height:           Physical Exam  Physical Exam   Constitutional: He is oriented to person, place, and time. He appears well-developed and well-nourished. No distress.   HENT:   Head: Normocephalic and atraumatic.   Right Ear: External ear normal.   Left Ear: External ear normal.   Nose: Nose normal.   Eyes: Conjunctivae and EOM are normal. Pupils are equal, round,  and reactive to light.   Neck: Normal range of motion. Neck supple.   Cardiovascular: Normal rate, regular rhythm, normal heart sounds and intact distal pulses.  Exam reveals no gallop and no friction rub.    No murmur heard.  Pulmonary/Chest: Effort normal and breath sounds normal. No respiratory distress. He has no wheezes. He has no rales. He exhibits no tenderness.   Abdominal: Soft. Bowel sounds are normal. He exhibits no distension and no mass. There is no tenderness. There is no rebound and no guarding.   Musculoskeletal: Normal range of motion.   Neurological: He is alert and oriented to person, place, and time.   Skin: Skin is warm and dry. No rash noted. He is not diaphoretic. No erythema. No pallor.   Psychiatric: He has a normal mood and affect. His behavior is normal.   Nursing note and vitals reviewed.      Medication Review    Current Facility-Administered Medications:   •  acetaminophen (TYLENOL) tablet 650 mg, 650 mg, Oral, Q4H PRN, Percy Nunez MD, 650 mg at 01/23/18 0420  •  aspirin chewable tablet 81 mg, 81 mg, Oral, Daily, Percy Nunez MD, 81 mg at 01/23/18 0802  •  busPIRone (BUSPAR) tablet 15 mg, 15 mg, Oral, Q12H, Percy Nunez MD, 15 mg at 01/23/18 0802  •  carvedilol (COREG) tablet 25 mg, 25 mg, Oral, BID With Meals, Percy Nunez MD, 25 mg at 01/23/18 1749  •  CloNIDine (CATAPRES) tablet 0.2 mg, 0.2 mg, Oral, Q8H, Percy Nunez MD, 0.2 mg at 01/23/18 1317  •  docusate sodium (COLACE) capsule 100 mg, 100 mg, Oral, BID, Percy Nunez MD, 100 mg at 01/23/18 0802  •  famotidine (PEPCID) tablet 40 mg, 40 mg, Oral, Daily, Percy Nunez MD, 40 mg at 01/23/18 0803  •  hydrALAZINE (APRESOLINE) injection 5 mg, 5 mg, Intravenous, Q6H PRN, Percy Nunez MD  •  hydrALAZINE (APRESOLINE) tablet 75 mg, 75 mg, Oral, TID, Gerber Hess MD, 75 mg at 01/23/18 1749  •  lisinopril (PRINIVIL,ZESTRIL) tablet 40 mg, 40 mg, Oral, Q24H, Percy Nunez MD, 40 mg at 01/23/18 0803  •  morphine injection 1 mg, 1 mg,  Intravenous, Q4H PRN **AND** naloxone (NARCAN) injection 0.4 mg, 0.4 mg, Intravenous, Q5 Min PRN, Percy Nunez MD  •  ondansetron (ZOFRAN) 4 MG/2ML injection  - ADS Override Pull, , , ,   •  ondansetron (ZOFRAN) injection 4 mg, 4 mg, Intravenous, Q6H PRN, Percy Nunez MD  •  sodium chloride 0.9 % flush 1-10 mL, 1-10 mL, Intravenous, PRN, Percy Nunez MD  •  sodium chloride 0.9 % flush 10 mL, 10 mL, Intravenous, PRN, Warren Junior MD, 10 mL at 01/22/18 2142  •  sodium chloride 0.9 % flush 10 mL, 10 mL, Intravenous, PRN, Warren Junior MD, 10 mL at 01/22/18 2247  •  traZODone (DESYREL) tablet 50 mg, 50 mg, Oral, Nightly, Percy Nunez MD     Diagnostic Data    Lab Results (last 24 hours)     Procedure Component Value Units Date/Time    CBC & Differential [[id number]] Collected:  01/22/18 2038    Specimen:  Blood Updated:  01/22/18 2129    Narrative:       The following orders were created for panel order CBC & Differential.  Procedure                               Abnormality         Status                     ---------                               -----------         ------                     CBC Auto Differential[[id number]]        Abnormal            Final result                 Please view results for these tests on the individual orders.    CBC Auto Differential [[id number]]  (Abnormal) Collected:  01/22/18 2038    Specimen:  Blood Updated:  01/22/18 2129     WBC 9.26 10*3/mm3      RBC 5.29 10*6/mm3      Hemoglobin 15.3 g/dL      Hematocrit 44.2 %      MCV 83.6 fL      MCH 28.9 pg      MCHC 34.6 g/dL      RDW 12.9 %      RDW-SD 38.7 fl      MPV 11.1 fL      Platelets 232 10*3/mm3      Neutrophil % 55.7 %      Lymphocyte % 30.5 %      Monocyte % 10.5 %      Eosinophil % 2.3 %      Basophil % 0.5 %      Immature Grans % 0.5 %      Neutrophils, Absolute 5.16 10*3/mm3      Lymphocytes, Absolute 2.82 10*3/mm3      Monocytes, Absolute 0.97 (H) 10*3/mm3      Eosinophils, Absolute 0.21 10*3/mm3       Basophils, Absolute 0.05 10*3/mm3      Immature Grans, Absolute 0.05 (H) 10*3/mm3     Protime-INR [[id number]]  (Normal) Collected:  01/22/18 2038    Specimen:  Blood Updated:  01/22/18 2131     Protime 12.0 Seconds      INR 0.90    Narrative:       Therapeutic range for most indications is 2.0-3.0 INR,  or 2.5-3.5 for mechanical heart valves.    CK [[id number]]  (Normal) Collected:  01/22/18 2038    Specimen:  Blood Updated:  01/22/18 2135     Creatine Kinase 91 U/L     Lipase [[id number]]  (Normal) Collected:  01/22/18 2038    Specimen:  Blood Updated:  01/22/18 2135     Lipase 184 U/L     Comprehensive Metabolic Panel [[id number]]  (Abnormal) Collected:  01/22/18 2038    Specimen:  Blood Updated:  01/22/18 2137     Glucose 115 (H) mg/dL      BUN 23 (H) mg/dL      Creatinine 1.31 (H) mg/dL      Sodium 137 mmol/L      Potassium 3.6 mmol/L      Chloride 103 mmol/L      CO2 22.0 mmol/L      Calcium 9.1 mg/dL      Total Protein 7.4 g/dL      Albumin 4.10 g/dL      ALT (SGPT) 42 U/L      AST (SGOT) 29 U/L      Alkaline Phosphatase 78 U/L      Total Bilirubin 0.5 mg/dL      eGFR Non African Amer 55 (L) mL/min/1.73      Globulin 3.3 gm/dL      A/G Ratio 1.2 g/dL      BUN/Creatinine Ratio 17.6     Anion Gap 12.0 mmol/L     [facility] [[id number]] Collected:  01/22/18 2038    Specimen:  Blood Updated:  01/22/18 2146    Narrative:       The following orders were created for panel order [facility].  Procedure                               Abnormality         Status                     ---------                               -----------         ------                     Light Blue Top[[id number]]                                   Final result               Green Top (Gel)[[id number]]                                  Final result               Lavender Top[[id number]]                                     Final result               Gold Top - SST[[id number]]                                   Final result                 Please view  results for these tests on the individual orders.    Light Blue Top [[id number]] Collected:  01/22/18 2038    Specimen:  Blood Updated:  01/22/18 2146     Extra Tube hold for add-on      Auto resulted       Green Top (Gel) [[id number]] Collected:  01/22/18 2038    Specimen:  Blood Updated:  01/22/18 2146     Extra Tube Hold for add-ons.      Auto resulted.       Lavender Top [[id number]] Collected:  01/22/18 2038    Specimen:  Blood Updated:  01/22/18 2146     Extra Tube hold for add-on      Auto resulted       Gold Top - SST [[id number]] Collected:  01/22/18 2038    Specimen:  Blood Updated:  01/22/18 2146     Extra Tube Hold for add-ons.      Auto resulted.       Troponin [[id number]]  (Normal) Collected:  01/22/18 2038    Specimen:  Blood Updated:  01/22/18 2147     Troponin I 0.023 ng/mL     BNP [[id number]]  (Normal) Collected:  01/22/18 2038    Specimen:  Blood Updated:  01/22/18 2147     proBNP 272.0 pg/mL     CK-MB [[id number]]  (Normal) Collected:  01/22/18 2038    Specimen:  Blood Updated:  01/22/18 2147     CKMB 1.72 ng/mL     CBC & Differential [[id number]] Collected:  01/23/18 0522    Specimen:  Blood Updated:  01/23/18 0555    Narrative:       The following orders were created for panel order CBC & Differential.  Procedure                               Abnormality         Status                     ---------                               -----------         ------                     CBC Auto Differential[[id number]]        Abnormal            Final result                 Please view results for these tests on the individual orders.    CBC Auto Differential [[id number]]  (Abnormal) Collected:  01/23/18 0522    Specimen:  Blood Updated:  01/23/18 0555     WBC 11.10 (H) 10*3/mm3      RBC 5.12 10*6/mm3      Hemoglobin 14.5 g/dL      Hematocrit 42.8 %      MCV 83.6 fL      MCH 28.3 pg      MCHC 33.9 g/dL      RDW 12.9 %      RDW-SD 39.0 fl      MPV 10.9 fL      Platelets 234 10*3/mm3      Neutrophil % 76.9 %       Lymphocyte % 14.1 %      Monocyte % 7.7 %      Eosinophil % 0.6 %      Basophil % 0.2 %      Immature Grans % 0.5 %      Neutrophils, Absolute 8.54 10*3/mm3      Lymphocytes, Absolute 1.56 10*3/mm3      Monocytes, Absolute 0.86 10*3/mm3      Eosinophils, Absolute 0.07 10*3/mm3      Basophils, Absolute 0.02 10*3/mm3      Immature Grans, Absolute 0.05 (H) 10*3/mm3     Basic Metabolic Panel [[id number]]  (Abnormal) Collected:  01/23/18 0522    Specimen:  Blood Updated:  01/23/18 0619     Glucose 144 (H) mg/dL      BUN 21 mg/dL      Creatinine 1.21 mg/dL      Sodium 137 mmol/L      Potassium 3.6 mmol/L      Chloride 104 mmol/L      CO2 24.0 mmol/L      Calcium 9.2 mg/dL      eGFR Non African Amer 60 mL/min/1.73      BUN/Creatinine Ratio 17.4     Anion Gap 9.0 mmol/L     Extra Tubes [[id number]] Collected:  01/23/18 0522    Specimen:  Blood from Blood, Venous Line Updated:  01/23/18 0631    Narrative:       The following orders were created for panel order Extra Tubes.  Procedure                               Abnormality         Status                     ---------                               -----------         ------                     Gold Top - SST[[id number]]                                   Final result                 Please view results for these tests on the individual orders.    Gold Top - SST [[id number]] Collected:  01/23/18 0522    Specimen:  Blood Updated:  01/23/18 0631     Extra Tube Hold for add-ons.      Auto resulted.       Troponin [[id number]]  (Normal) Collected:  01/23/18 0522    Specimen:  Blood Updated:  01/23/18 0635     Troponin I 0.019 ng/mL           I reviewed the patient's new clinical results.    Assessment/Plan:     Active Hospital Problems (** Indicates Principal Problem)    Diagnosis Date Noted   • **Precordial pain [R07.2] 01/22/2018   • Resistant hypertension [I10] 01/23/2018   • Sinus bradycardia [R00.1] 01/23/2018      Resolved Hospital Problems    Diagnosis Date Noted Date Resolved    No resolved problems to display.     1. Chest pain.  Per patient chest pain resolved with decrease in blood pressure.  Currently chest pain free.  Enzymes negative x2. EKG reviewed. ECHO ordered today.  2. Resistant HTN. Renal artery ultrasound ordered.  Increased PO hydralazine to 75 tid from 50 tid.  3. Sinus bradycardia. Asymptomatic. Monitor.    Will monitor patient's hospital course and adjust treatment as hospital course dictates.    DVT prophylaxis: SCD/TEDs  Code status is Full Code    Plan for disposition:Where: home and When:  0-1 days      Time:           This document has been electronically signed by Gerber Hess MD on January 23, 2018 5:52 PM

## 2018-01-23 NOTE — ED PROVIDER NOTES
"Subjective   Patient is a 67 y.o. male presenting with chest pain.   Chest Pain   Pain location:  L chest and substernal area  Pain quality comment:  \"twinge\"  Pain radiates to:  Does not radiate  Pain severity:  Mild  Onset quality:  Gradual  Duration:  2 days  Timing:  Intermittent  Progression:  Waxing and waning  Chronicity:  Recurrent  Relieved by:  Nothing  Worsened by:  Nothing  Ineffective treatments:  None tried  Associated symptoms: headache, lower extremity edema, palpitations and shortness of breath    Associated symptoms: no abdominal pain, no anxiety, no back pain, no claudication, no cough, no diaphoresis, no dizziness, no dysphagia, no fatigue, no fever, no nausea, no near-syncope, no syncope, no vomiting and no weakness    Risk factors: high cholesterol, hypertension and male sex    Risk factors: no coronary artery disease, no diabetes mellitus and no smoking        Review of Systems   Constitutional: Negative for appetite change, chills, diaphoresis, fatigue and fever.   HENT: Negative for congestion, ear discharge, ear pain, nosebleeds, rhinorrhea, sinus pressure, sore throat and trouble swallowing.    Eyes: Negative for discharge and redness.   Respiratory: Positive for shortness of breath. Negative for apnea, cough, chest tightness and wheezing.    Cardiovascular: Positive for chest pain and palpitations. Negative for claudication, syncope and near-syncope.   Gastrointestinal: Negative for abdominal pain, diarrhea, nausea and vomiting.   Endocrine: Negative for polyuria.   Genitourinary: Negative for dysuria, frequency and urgency.   Musculoskeletal: Negative for back pain, myalgias and neck pain.   Skin: Negative for color change and rash.   Allergic/Immunologic: Negative for immunocompromised state.   Neurological: Positive for headaches. Negative for dizziness, seizures, syncope, weakness and light-headedness.   Hematological: Negative for adenopathy. Does not bruise/bleed easily. "   Psychiatric/Behavioral: Negative for behavioral problems and confusion.   All other systems reviewed and are negative.      Past Medical History:   Diagnosis Date   • Hypertension        No Known Allergies    No past surgical history on file.    No family history on file.    Social History     Social History   • Marital status:      Spouse name: N/A   • Number of children: N/A   • Years of education: N/A     Social History Main Topics   • Smoking status: Never Smoker   • Smokeless tobacco: Never Used   • Alcohol use No   • Drug use: No   • Sexual activity: Defer     Other Topics Concern   • Not on file     Social History Narrative           Objective   Physical Exam   Constitutional: He is oriented to person, place, and time. He appears well-developed and well-nourished.   HENT:   Head: Normocephalic and atraumatic.   Nose: Nose normal.   Mouth/Throat: Oropharynx is clear and moist.   Eyes: Conjunctivae and EOM are normal. Pupils are equal, round, and reactive to light. Right eye exhibits no discharge. Left eye exhibits no discharge. No scleral icterus.   Neck: Normal range of motion. Neck supple. No tracheal deviation present.   Cardiovascular: Normal rate, regular rhythm and normal heart sounds.    No murmur heard.  Pulmonary/Chest: Effort normal and breath sounds normal. No stridor. No respiratory distress. He has no wheezes. He has no rales.   Abdominal: Soft. Bowel sounds are normal. He exhibits no distension and no mass. There is no tenderness. There is no rebound and no guarding.   Musculoskeletal: He exhibits no edema.   Neurological: He is alert and oriented to person, place, and time. Coordination normal.   Skin: Skin is warm and dry. No rash noted. No erythema.   Psychiatric: He has a normal mood and affect. His behavior is normal. Thought content normal.   Nursing note and vitals reviewed.      ECG 12 Lead    Date/Time: 1/22/2018 11:12 PM  Performed by: AMPARO LEWIS  Authorized by:  AMPARO LEWIS   Interpreted by physician  Rhythm: sinus bradycardia  Rate: bradycardic  BPM: 59  Conduction: conduction normal  ST Segments: ST segments normal                 ED Course  ED Course        Labs Reviewed   COMPREHENSIVE METABOLIC PANEL - Abnormal; Notable for the following:        Result Value    Glucose 115 (*)     BUN 23 (*)     Creatinine 1.31 (*)     eGFR Non  Amer 55 (*)     All other components within normal limits   CBC WITH AUTO DIFFERENTIAL - Abnormal; Notable for the following:     Monocytes, Absolute 0.97 (*)     Immature Grans, Absolute 0.05 (*)     All other components within normal limits   TROPONIN (IN-HOUSE) - Normal   BNP (IN-HOUSE) - Normal   PROTIME-INR - Normal    Narrative:     Therapeutic range for most indications is 2.0-3.0 INR,  or 2.5-3.5 for mechanical heart valves.   CK - Normal   CK MB - Normal   LIPASE - Normal   RAINBOW DRAW    Narrative:     The following orders were created for panel order [facility] Draw.  Procedure                               Abnormality         Status                     ---------                               -----------         ------                     Light Blue Top[[id number]]                                   Final result               Green Top (Gel)[[id number]]                                  Final result               Lavender Top[[id number]]                                     Final result               Gold Top - SST[[id number]]                                   Final result                 Please view results for these tests on the individual orders.   RAINBOW DRAW    Narrative:     The following orders were created for panel order [facility] Draw.  Procedure                               Abnormality         Status                     ---------                               -----------         ------                     Light Blue Top[[id number]]                                                              Green Top  (Gel)[[id number]]                                                             Lavender Top[[id number]]                                                                Gold Top - SST[[id number]]                                                                Please view results for these tests on the individual orders.   TROPONIN (IN-HOUSE)   LIGHT BLUE TOP   GREEN TOP   LAVENDER TOP   GOLD TOP - SST   CBC AND DIFFERENTIAL    Narrative:     The following orders were created for panel order CBC & Differential.  Procedure                               Abnormality         Status                     ---------                               -----------         ------                     CBC Auto Differential[[id number]]        Abnormal            Final result                 Please view results for these tests on the individual orders.   LIGHT BLUE TOP   GREEN TOP   LAVENDER TOP   GOLD TOP - SST       XR Chest 2 View   Final Result   No acute cardiopulmonary abnormality.      Electronically signed by:  Asim Larsen MD  1/22/2018 9:55 PM   [facility] Workstation: [id number]    Final diagnoses:   Precordial pain   Essential hypertension            Warren Junior MD  01/22/18 8552

## 2018-01-23 NOTE — PLAN OF CARE
Problem: Patient Care Overview (Adult)  Goal: Plan of Care Review  Outcome: Ongoing (interventions implemented as appropriate)   01/23/18 0559   Coping/Psychosocial Response Interventions   Plan Of Care Reviewed With patient   Patient Care Overview   Progress improving   Outcome Evaluation   Outcome Summary/Follow up Plan No C/O CP or discomfort upon arrival to floor. BP is now 160's systolic. Will be undergoing ECHO today. Will continue to monitor.        Problem: Acute Coronary Syndrome (ACS) (Adult)  Goal: Signs and Symptoms of Listed Potential Problems Will be Absent or Manageable (Acute Coronary Syndrome)  Outcome: Ongoing (interventions implemented as appropriate)

## 2018-01-23 NOTE — NURSING NOTE
"Suggested patient wear oxygen based upon admission diagnosis, patient refused and stated \"I don't feel like I need it.\"  "

## 2018-01-24 ENCOUNTER — APPOINTMENT (OUTPATIENT)
Dept: ULTRASOUND IMAGING | Facility: HOSPITAL | Age: 68
End: 2018-01-24

## 2018-01-24 LAB
ANION GAP SERPL CALCULATED.3IONS-SCNC: 10 MMOL/L (ref 5–15)
BASOPHILS # BLD AUTO: 0.05 10*3/MM3 (ref 0–0.2)
BASOPHILS NFR BLD AUTO: 0.6 % (ref 0–2)
BUN BLD-MCNC: 26 MG/DL (ref 7–21)
BUN/CREAT SERPL: 18.4 (ref 7–25)
CALCIUM SPEC-SCNC: 9.3 MG/DL (ref 8.4–10.2)
CHLORIDE SERPL-SCNC: 101 MMOL/L (ref 95–110)
CO2 SERPL-SCNC: 26 MMOL/L (ref 22–31)
CREAT BLD-MCNC: 1.41 MG/DL (ref 0.7–1.3)
CREAT UR-MCNC: 81.2 MG/DL
DEPRECATED RDW RBC AUTO: 40.1 FL (ref 35.1–43.9)
EOSINOPHIL # BLD AUTO: 0.17 10*3/MM3 (ref 0–0.7)
EOSINOPHIL NFR BLD AUTO: 2 % (ref 0–7)
ERYTHROCYTE [DISTWIDTH] IN BLOOD BY AUTOMATED COUNT: 13.2 % (ref 11.5–14.5)
GFR SERPL CREATININE-BSD FRML MDRD: 50 ML/MIN/1.73 (ref 49–113)
GLUCOSE BLD-MCNC: 139 MG/DL (ref 60–100)
HCT VFR BLD AUTO: 45.5 % (ref 39–49)
HGB BLD-MCNC: 15.1 G/DL (ref 13.7–17.3)
IMM GRANULOCYTES # BLD: 0.04 10*3/MM3 (ref 0–0.02)
IMM GRANULOCYTES NFR BLD: 0.5 % (ref 0–0.5)
LYMPHOCYTES # BLD AUTO: 2.28 10*3/MM3 (ref 0.6–4.2)
LYMPHOCYTES NFR BLD AUTO: 26.2 % (ref 10–50)
MCH RBC QN AUTO: 28 PG (ref 26.5–34)
MCHC RBC AUTO-ENTMCNC: 33.2 G/DL (ref 31.5–36.3)
MCV RBC AUTO: 84.4 FL (ref 80–98)
MONOCYTES # BLD AUTO: 1.01 10*3/MM3 (ref 0–0.9)
MONOCYTES NFR BLD AUTO: 11.6 % (ref 0–12)
NEUTROPHILS # BLD AUTO: 5.14 10*3/MM3 (ref 2–8.6)
NEUTROPHILS NFR BLD AUTO: 59.1 % (ref 37–80)
PLATELET # BLD AUTO: 242 10*3/MM3 (ref 150–450)
PMV BLD AUTO: 11 FL (ref 8–12)
POTASSIUM BLD-SCNC: 3.3 MMOL/L (ref 3.5–5.1)
PROT UR-MCNC: 12 MG/DL
PROT/CREAT UR: 147.8 MG/G CREA (ref 0–200)
RBC # BLD AUTO: 5.39 10*6/MM3 (ref 4.37–5.74)
SODIUM BLD-SCNC: 137 MMOL/L (ref 137–145)
WBC NRBC COR # BLD: 8.69 10*3/MM3 (ref 3.2–9.8)

## 2018-01-24 PROCEDURE — 84156 ASSAY OF PROTEIN URINE: CPT | Performed by: INTERNAL MEDICINE

## 2018-01-24 PROCEDURE — 80048 BASIC METABOLIC PNL TOTAL CA: CPT | Performed by: FAMILY MEDICINE

## 2018-01-24 PROCEDURE — 82570 ASSAY OF URINE CREATININE: CPT | Performed by: INTERNAL MEDICINE

## 2018-01-24 PROCEDURE — 76775 US EXAM ABDO BACK WALL LIM: CPT

## 2018-01-24 PROCEDURE — 85025 COMPLETE CBC W/AUTO DIFF WBC: CPT | Performed by: FAMILY MEDICINE

## 2018-01-24 PROCEDURE — 93975 VASCULAR STUDY: CPT

## 2018-01-24 PROCEDURE — G0378 HOSPITAL OBSERVATION PER HR: HCPCS

## 2018-01-24 RX ORDER — POTASSIUM CHLORIDE 750 MG/1
40 CAPSULE, EXTENDED RELEASE ORAL ONCE
Status: COMPLETED | OUTPATIENT
Start: 2018-01-24 | End: 2018-01-24

## 2018-01-24 RX ADMIN — ASPIRIN 81 MG 81 MG: 81 TABLET ORAL at 09:53

## 2018-01-24 RX ADMIN — BUSPIRONE HYDROCHLORIDE 15 MG: 15 TABLET ORAL at 22:00

## 2018-01-24 RX ADMIN — TRAZODONE HYDROCHLORIDE 50 MG: 50 TABLET ORAL at 22:00

## 2018-01-24 RX ADMIN — BUSPIRONE HYDROCHLORIDE 15 MG: 15 TABLET ORAL at 09:53

## 2018-01-24 RX ADMIN — CLONIDINE HYDROCHLORIDE 0.2 MG: 0.2 TABLET ORAL at 05:42

## 2018-01-24 RX ADMIN — DOCUSATE SODIUM 100 MG: 100 CAPSULE, LIQUID FILLED ORAL at 22:03

## 2018-01-24 RX ADMIN — HYDRALAZINE HYDROCHLORIDE 75 MG: 50 TABLET ORAL at 21:59

## 2018-01-24 RX ADMIN — CARVEDILOL 25 MG: 25 TABLET, FILM COATED ORAL at 09:53

## 2018-01-24 RX ADMIN — CARVEDILOL 25 MG: 25 TABLET, FILM COATED ORAL at 17:43

## 2018-01-24 RX ADMIN — POTASSIUM CHLORIDE 40 MEQ: 750 CAPSULE, EXTENDED RELEASE ORAL at 17:43

## 2018-01-24 RX ADMIN — CLONIDINE HYDROCHLORIDE 0.2 MG: 0.2 TABLET ORAL at 14:51

## 2018-01-24 RX ADMIN — HYDRALAZINE HYDROCHLORIDE 75 MG: 50 TABLET ORAL at 14:51

## 2018-01-24 RX ADMIN — FAMOTIDINE 40 MG: 40 TABLET ORAL at 09:53

## 2018-01-24 RX ADMIN — DOCUSATE SODIUM 100 MG: 100 CAPSULE, LIQUID FILLED ORAL at 09:52

## 2018-01-24 RX ADMIN — LISINOPRIL 40 MG: 40 TABLET ORAL at 09:53

## 2018-01-24 RX ADMIN — HYDRALAZINE HYDROCHLORIDE 75 MG: 50 TABLET ORAL at 09:53

## 2018-01-24 RX ADMIN — CLONIDINE HYDROCHLORIDE 0.2 MG: 0.2 TABLET ORAL at 22:00

## 2018-01-24 NOTE — PLAN OF CARE
Problem: Patient Care Overview (Adult)  Goal: Plan of Care Review  Outcome: Ongoing (interventions implemented as appropriate)   01/24/18 1205   Coping/Psychosocial Response Interventions   Plan Of Care Reviewed With patient   Patient Care Overview   Progress improving   Outcome Evaluation   Outcome Summary/Follow up Plan blood pressure more controlled today       Problem: Acute Coronary Syndrome (ACS) (Adult)  Goal: Signs and Symptoms of Listed Potential Problems Will be Absent or Manageable (Acute Coronary Syndrome)  Outcome: Ongoing (interventions implemented as appropriate)

## 2018-01-24 NOTE — PLAN OF CARE
Problem: Patient Care Overview (Adult)  Goal: Plan of Care Review  Outcome: Ongoing (interventions implemented as appropriate)   01/24/18 0320   Coping/Psychosocial Response Interventions   Plan Of Care Reviewed With patient   Patient Care Overview   Progress no change   Outcome Evaluation   Outcome Summary/Follow up Plan bp remaining high after medication adjustment today. Will continue to monitor for improvment      Goal: Adult Individualization and Mutuality  Outcome: Ongoing (interventions implemented as appropriate)    Goal: Discharge Needs Assessment  Outcome: Ongoing (interventions implemented as appropriate)   01/24/18 0320   Discharge Needs Assessment   Discharge Disposition still a patient       Problem: Acute Coronary Syndrome (ACS) (Adult)  Goal: Signs and Symptoms of Listed Potential Problems Will be Absent or Manageable (Acute Coronary Syndrome)  Outcome: Ongoing (interventions implemented as appropriate)   01/24/18 0320   Acute Coronary Syndrome (ACS)   Problems Assessed (Acute Coronary Syndrome (ACS)) all   Problems Present (Acute Coronary Syndrome (ACS)) none

## 2018-01-24 NOTE — PROGRESS NOTES
MEDICINE DAILY PROGRESS NOTE  NAME: Huber Gomez  : 1950  MRN: [id number]     LOS: 0 days     PROVIDER OF SERVICE: Gerber Hess MD    Chief Complaint: Precordial pain    Subjective:     Interval History:  History taken from: patient chart RN  . Chest pain free today.  . Chest pain free at time of exam.  No acute events overnight.    Review of Systems:   Review of Systems   Constitutional: Positive for fatigue. Negative for activity change, appetite change and fever.   HENT: Negative for ear pain and sore throat.    Eyes: Negative for pain and visual disturbance.   Respiratory: Negative for cough and shortness of breath.    Cardiovascular: Positive for chest pain (Resolved.). Negative for palpitations.   Gastrointestinal: Negative for abdominal pain and nausea.   Endocrine: Negative for cold intolerance and heat intolerance.   Genitourinary: Negative for difficulty urinating and dysuria.   Musculoskeletal: Negative for arthralgias and gait problem.   Skin: Negative for color change and rash.   Neurological: Negative for dizziness, weakness and headaches.   Hematological: Negative for adenopathy. Does not bruise/bleed easily.   Psychiatric/Behavioral: Negative for agitation, confusion and sleep disturbance.       Objective:     Vital Signs  Vitals:    18 0700 18 0851 18 1102 18 1140   BP: 147/79  122/72 147/79   BP Location: Left arm  Right arm Right arm   Patient Position: Lying  Lying Lying   Pulse: 52 64  58   Resp: 18   18   Temp: 97.9 °F (36.6 °C)   98.4 °F (36.9 °C)   TempSrc: Temporal Artery    Temporal Artery    SpO2: 95%   94%   Weight:       Height:           Physical Exam  Physical Exam   Constitutional: He is oriented to person, place, and time. He appears well-developed and well-nourished. No distress.   HENT:   Head: Normocephalic and atraumatic.   Right Ear: External ear normal.   Left Ear: External ear normal.   Nose: Nose normal.   Eyes: Conjunctivae and EOM  are normal. Pupils are equal, round, and reactive to light.   Neck: Normal range of motion. Neck supple.   Cardiovascular: Normal rate, regular rhythm, normal heart sounds and intact distal pulses.  Exam reveals no gallop and no friction rub.    No murmur heard.  Pulmonary/Chest: Effort normal and breath sounds normal. No respiratory distress. He has no wheezes. He has no rales. He exhibits no tenderness.   Abdominal: Soft. Bowel sounds are normal. He exhibits no distension and no mass. There is no tenderness. There is no rebound and no guarding.   Musculoskeletal: Normal range of motion.   Neurological: He is alert and oriented to person, place, and time.   Skin: Skin is warm and dry. No rash noted. He is not diaphoretic. No erythema. No pallor.   Psychiatric: He has a normal mood and affect. His behavior is normal.   Nursing note and vitals reviewed.      Medication Review    Current Facility-Administered Medications:   •  acetaminophen (TYLENOL) tablet 650 mg, 650 mg, Oral, Q4H PRN, Percy Nunez MD, 650 mg at 01/23/18 0420  •  aspirin chewable tablet 81 mg, 81 mg, Oral, Daily, Percy Nunez MD, 81 mg at 01/24/18 0953  •  busPIRone (BUSPAR) tablet 15 mg, 15 mg, Oral, Q12H, Percy Nunez MD, 15 mg at 01/24/18 0953  •  carvedilol (COREG) tablet 25 mg, 25 mg, Oral, BID With Meals, Peryc Nunez MD, 25 mg at 01/24/18 0953  •  CloNIDine (CATAPRES) tablet 0.2 mg, 0.2 mg, Oral, Q8H, Percy Nunez MD, 0.2 mg at 01/24/18 1451  •  docusate sodium (COLACE) capsule 100 mg, 100 mg, Oral, BID, Percy Nunez MD, 100 mg at 01/24/18 0952  •  famotidine (PEPCID) tablet 40 mg, 40 mg, Oral, Daily, Percy Nunez MD, 40 mg at 01/24/18 0953  •  hydrALAZINE (APRESOLINE) injection 5 mg, 5 mg, Intravenous, Q6H PRN, Percy Nunez MD  •  hydrALAZINE (APRESOLINE) tablet 75 mg, 75 mg, Oral, TID, Gerber Hess MD, 75 mg at 01/24/18 1451  •  lisinopril (PRINIVIL,ZESTRIL) tablet 40 mg, 40 mg, Oral, Q24H, Percy Nunez MD, 40 mg at 01/24/18  0953  •  morphine injection 1 mg, 1 mg, Intravenous, Q4H PRN **AND** naloxone (NARCAN) injection 0.4 mg, 0.4 mg, Intravenous, Q5 Min PRN, Percy Nunez MD  •  ondansetron (ZOFRAN) 4 MG/2ML injection  - ADS Override Pull, , , ,   •  ondansetron (ZOFRAN) injection 4 mg, 4 mg, Intravenous, Q6H PRN, Percy Nunez MD  •  sodium chloride 0.9 % flush 1-10 mL, 1-10 mL, Intravenous, PRN, Percy Nunez MD  •  sodium chloride 0.9 % flush 10 mL, 10 mL, Intravenous, PRN, Warren Junior MD, 10 mL at 01/22/18 2142  •  sodium chloride 0.9 % flush 10 mL, 10 mL, Intravenous, PRN, Warren Junior MD, 10 mL at 01/22/18 2247  •  traZODone (DESYREL) tablet 50 mg, 50 mg, Oral, Nightly, Percy Nunez MD, 50 mg at 01/23/18 2318     Diagnostic Data    Lab Results (last 24 hours)     Procedure Component Value Units Date/Time    CBC & Differential [[id number]] Collected:  01/24/18 0606    Specimen:  Blood Updated:  01/24/18 0651    Narrative:       The following orders were created for panel order CBC & Differential.  Procedure                               Abnormality         Status                     ---------                               -----------         ------                     CBC Auto Differential[[id number]]        Abnormal            Final result                 Please view results for these tests on the individual orders.    CBC Auto Differential [[id number]]  (Abnormal) Collected:  01/24/18 0606    Specimen:  Blood Updated:  01/24/18 0651     WBC 8.69 10*3/mm3      RBC 5.39 10*6/mm3      Hemoglobin 15.1 g/dL      Hematocrit 45.5 %      MCV 84.4 fL      MCH 28.0 pg      MCHC 33.2 g/dL      RDW 13.2 %      RDW-SD 40.1 fl      MPV 11.0 fL      Platelets 242 10*3/mm3      Neutrophil % 59.1 %      Lymphocyte % 26.2 %      Monocyte % 11.6 %      Eosinophil % 2.0 %      Basophil % 0.6 %      Immature Grans % 0.5 %      Neutrophils, Absolute 5.14 10*3/mm3      Lymphocytes, Absolute 2.28 10*3/mm3      Monocytes, Absolute 1.01  (H) 10*3/mm3      Eosinophils, Absolute 0.17 10*3/mm3      Basophils, Absolute 0.05 10*3/mm3      Immature Grans, Absolute 0.04 (H) 10*3/mm3     Basic Metabolic Panel [[id number]]  (Abnormal) Collected:  01/24/18 0606    Specimen:  Blood Updated:  01/24/18 0714     Glucose 139 (H) mg/dL      BUN 26 (H) mg/dL      Creatinine 1.41 (H) mg/dL      Sodium 137 mmol/L      Potassium 3.3 (L) mmol/L      Chloride 101 mmol/L      CO2 26.0 mmol/L      Calcium 9.3 mg/dL      eGFR Non African Amer 50 mL/min/1.73      BUN/Creatinine Ratio 18.4     Anion Gap 10.0 mmol/L           I reviewed the patient's new clinical results.    Assessment/Plan:     Active Hospital Problems (** Indicates Principal Problem)    Diagnosis Date Noted   • **Precordial pain [R07.2] 01/22/2018   • Resistant hypertension [I10] 01/23/2018   • Sinus bradycardia [R00.1] 01/23/2018      Resolved Hospital Problems    Diagnosis Date Noted Date Resolved   No resolved problems to display.     1. Chest pain.    1/24. Patient chest pain free. Renal artery workup pending.  1/23. Per patient chest pain resolved with decrease in blood pressure.  Currently chest pain free.  Enzymes negative x2. EKG reviewed. ECHO ordered today.  2. Resistant HTN.   1/24. Renal artery workup pending. Nephrology consulted.  Appreciate input.  1/23. Renal artery ultrasound ordered.  Increased PO hydralazine to 75 tid from 50 tid.  3. Sinus bradycardia. Asymptomatic. Monitor.    Will monitor patient's hospital course and adjust treatment as hospital course dictates.    DVT prophylaxis: SCD/TEDs  Code status is Full Code    Plan for disposition:Where: home and When:  0-1 days      Time:           This document has been electronically signed by Gerber Hess MD on January 24, 2018 2:57 PM

## 2018-01-24 NOTE — PLAN OF CARE
Problem: Patient Care Overview (Adult)  Goal: Plan of Care Review  Outcome: Ongoing (interventions implemented as appropriate)   01/23/18 5346   Coping/Psychosocial Response Interventions   Plan Of Care Reviewed With patient   Patient Care Overview   Progress no change   Outcome Evaluation   Outcome Summary/Follow up Plan blood pressure still high       Problem: Acute Coronary Syndrome (ACS) (Adult)  Goal: Signs and Symptoms of Listed Potential Problems Will be Absent or Manageable (Acute Coronary Syndrome)  Outcome: Ongoing (interventions implemented as appropriate)

## 2018-01-25 VITALS
HEIGHT: 68 IN | HEART RATE: 58 BPM | RESPIRATION RATE: 18 BRPM | SYSTOLIC BLOOD PRESSURE: 134 MMHG | BODY MASS INDEX: 33.8 KG/M2 | OXYGEN SATURATION: 98 % | TEMPERATURE: 97.8 F | DIASTOLIC BLOOD PRESSURE: 68 MMHG | WEIGHT: 223 LBS

## 2018-01-25 LAB
ANION GAP SERPL CALCULATED.3IONS-SCNC: 8 MMOL/L (ref 5–15)
ARTICHOKE IGE QN: 126 MG/DL (ref 1–129)
BASOPHILS # BLD AUTO: 0.04 10*3/MM3 (ref 0–0.2)
BASOPHILS NFR BLD AUTO: 0.5 % (ref 0–2)
BUN BLD-MCNC: 25 MG/DL (ref 7–21)
BUN/CREAT SERPL: 19.2 (ref 7–25)
CALCIUM SPEC-SCNC: 9.2 MG/DL (ref 8.4–10.2)
CHLORIDE SERPL-SCNC: 103 MMOL/L (ref 95–110)
CHOLEST SERPL-MCNC: 205 MG/DL (ref 0–199)
CO2 SERPL-SCNC: 26 MMOL/L (ref 22–31)
CREAT BLD-MCNC: 1.3 MG/DL (ref 0.7–1.3)
DEPRECATED RDW RBC AUTO: 40 FL (ref 35.1–43.9)
EOSINOPHIL # BLD AUTO: 0.19 10*3/MM3 (ref 0–0.7)
EOSINOPHIL NFR BLD AUTO: 2.2 % (ref 0–7)
ERYTHROCYTE [DISTWIDTH] IN BLOOD BY AUTOMATED COUNT: 13.2 % (ref 11.5–14.5)
GFR SERPL CREATININE-BSD FRML MDRD: 55 ML/MIN/1.73 (ref 49–113)
GLUCOSE BLD-MCNC: 134 MG/DL (ref 60–100)
HCT VFR BLD AUTO: 44.1 % (ref 39–49)
HDLC SERPL-MCNC: 34 MG/DL (ref 60–200)
HGB BLD-MCNC: 14.7 G/DL (ref 13.7–17.3)
IMM GRANULOCYTES # BLD: 0.05 10*3/MM3 (ref 0–0.02)
IMM GRANULOCYTES NFR BLD: 0.6 % (ref 0–0.5)
LDLC/HDLC SERPL: 3.62 {RATIO} (ref 0–3.55)
LYMPHOCYTES # BLD AUTO: 2.51 10*3/MM3 (ref 0.6–4.2)
LYMPHOCYTES NFR BLD AUTO: 28.5 % (ref 10–50)
MCH RBC QN AUTO: 28.1 PG (ref 26.5–34)
MCHC RBC AUTO-ENTMCNC: 33.3 G/DL (ref 31.5–36.3)
MCV RBC AUTO: 84.3 FL (ref 80–98)
MONOCYTES # BLD AUTO: 0.89 10*3/MM3 (ref 0–0.9)
MONOCYTES NFR BLD AUTO: 10.1 % (ref 0–12)
NEUTROPHILS # BLD AUTO: 5.14 10*3/MM3 (ref 2–8.6)
NEUTROPHILS NFR BLD AUTO: 58.1 % (ref 37–80)
PLATELET # BLD AUTO: 239 10*3/MM3 (ref 150–450)
PMV BLD AUTO: 10.5 FL (ref 8–12)
POTASSIUM BLD-SCNC: 3.7 MMOL/L (ref 3.5–5.1)
RBC # BLD AUTO: 5.23 10*6/MM3 (ref 4.37–5.74)
SODIUM BLD-SCNC: 137 MMOL/L (ref 137–145)
TRIGL SERPL-MCNC: 239 MG/DL (ref 20–199)
WBC NRBC COR # BLD: 8.82 10*3/MM3 (ref 3.2–9.8)

## 2018-01-25 PROCEDURE — 85025 COMPLETE CBC W/AUTO DIFF WBC: CPT | Performed by: FAMILY MEDICINE

## 2018-01-25 PROCEDURE — 82088 ASSAY OF ALDOSTERONE: CPT | Performed by: INTERNAL MEDICINE

## 2018-01-25 PROCEDURE — 82384 ASSAY THREE CATECHOLAMINES: CPT | Performed by: INTERNAL MEDICINE

## 2018-01-25 PROCEDURE — 80048 BASIC METABOLIC PNL TOTAL CA: CPT | Performed by: FAMILY MEDICINE

## 2018-01-25 PROCEDURE — 80061 LIPID PANEL: CPT | Performed by: INTERNAL MEDICINE

## 2018-01-25 PROCEDURE — G0378 HOSPITAL OBSERVATION PER HR: HCPCS

## 2018-01-25 PROCEDURE — 84244 ASSAY OF RENIN: CPT | Performed by: INTERNAL MEDICINE

## 2018-01-25 RX ORDER — TRIAMTERENE AND HYDROCHLOROTHIAZIDE 37.5; 25 MG/1; MG/1
1 CAPSULE ORAL DAILY
Status: DISCONTINUED | OUTPATIENT
Start: 2018-01-25 | End: 2018-01-25 | Stop reason: HOSPADM

## 2018-01-25 RX ORDER — ATORVASTATIN CALCIUM 20 MG/1
20 TABLET, FILM COATED ORAL NIGHTLY
Status: DISCONTINUED | OUTPATIENT
Start: 2018-01-25 | End: 2018-01-25 | Stop reason: HOSPADM

## 2018-01-25 RX ORDER — ATORVASTATIN CALCIUM 20 MG/1
20 TABLET, FILM COATED ORAL DAILY
Qty: 30 TABLET | Refills: 0 | Status: SHIPPED | OUTPATIENT
Start: 2018-01-25

## 2018-01-25 RX ORDER — FUROSEMIDE 40 MG/1
40 TABLET ORAL EVERY OTHER DAY
Status: DISCONTINUED | OUTPATIENT
Start: 2018-01-26 | End: 2018-01-25

## 2018-01-25 RX ORDER — TRIAMTERENE AND HYDROCHLOROTHIAZIDE 37.5; 25 MG/1; MG/1
1 CAPSULE ORAL DAILY
Qty: 30 CAPSULE | Refills: 0 | Status: SHIPPED | OUTPATIENT
Start: 2018-01-26 | End: 2018-07-28

## 2018-01-25 RX ADMIN — CARVEDILOL 25 MG: 25 TABLET, FILM COATED ORAL at 08:46

## 2018-01-25 RX ADMIN — HYDRALAZINE HYDROCHLORIDE 75 MG: 50 TABLET ORAL at 08:46

## 2018-01-25 RX ADMIN — BUSPIRONE HYDROCHLORIDE 15 MG: 15 TABLET ORAL at 08:46

## 2018-01-25 RX ADMIN — CLONIDINE HYDROCHLORIDE 0.2 MG: 0.2 TABLET ORAL at 13:20

## 2018-01-25 RX ADMIN — TRIAMTERENE AND HYDROCHLOROTHIAZIDE 1 CAPSULE: 25; 37.5 CAPSULE ORAL at 13:20

## 2018-01-25 RX ADMIN — ASPIRIN 81 MG 81 MG: 81 TABLET ORAL at 08:46

## 2018-01-25 RX ADMIN — DOCUSATE SODIUM 100 MG: 100 CAPSULE, LIQUID FILLED ORAL at 08:46

## 2018-01-25 RX ADMIN — FAMOTIDINE 40 MG: 40 TABLET ORAL at 08:46

## 2018-01-25 RX ADMIN — LISINOPRIL 40 MG: 40 TABLET ORAL at 08:46

## 2018-01-25 RX ADMIN — CLONIDINE HYDROCHLORIDE 0.2 MG: 0.2 TABLET ORAL at 05:43

## 2018-01-25 RX ADMIN — HYDRALAZINE HYDROCHLORIDE 75 MG: 50 TABLET ORAL at 13:20

## 2018-01-25 NOTE — PLAN OF CARE
Problem: Patient Care Overview (Adult)  Goal: Plan of Care Review  Outcome: Ongoing (interventions implemented as appropriate)   01/25/18 1055   Coping/Psychosocial Response Interventions   Plan Of Care Reviewed With patient   Patient Care Overview   Progress improving   Outcome Evaluation   Outcome Summary/Follow up Plan patient's blood pressure systolic 160's mostly       Problem: Acute Coronary Syndrome (ACS) (Adult)  Goal: Signs and Symptoms of Listed Potential Problems Will be Absent or Manageable (Acute Coronary Syndrome)  Outcome: Ongoing (interventions implemented as appropriate)

## 2018-01-25 NOTE — PROGRESS NOTES
"[facility]  [address] [place] [address]. [address]  T - [phone]  F - [phone]     Progress Note          PATIENT  DEMOGRAPHICS   PATIENT NAME: Huber Gomez                      PHYSICIAN: Jasmina Lopez MD  : 1950  MRN: [id number]   LOS: 0 days    Patient Care Team:  Justine Tirado MD as PCP - General (Family Medicine)  Subjective   SUBJECTIVE   bp is relatively better         Objective   OBJECTIVE   Vital Signs  Temp:  [97.2 °F (36.2 °C)-98.5 °F (36.9 °C)] 97.9 °F (36.6 °C)  Heart Rate:  [43-65] 55  Resp:  [18] 18  BP: (140-180)/(78-98) 150/84    Flowsheet Rows         First Filed Value    Admission Height  172.7 cm (68\") Documented at 2018    Admission Weight  104 kg (230 lb) Documented at 2018           I/O last 3 completed shifts:  In: 360 [P.O.:360]  Out: -     PHYSICAL EXAM    Physical Exam   Constitutional: He is oriented to person, place, and time. He appears well-developed.   HENT:   Head: Normocephalic.   Eyes: Pupils are equal, round, and reactive to light.   Cardiovascular: Normal rate, regular rhythm and normal heart sounds.    Pulmonary/Chest: Effort normal. He has rales.   Abdominal: Soft. Bowel sounds are normal.   Musculoskeletal: He exhibits no edema.   Neurological: He is alert and oriented to person, place, and time.       RESULTS   Results Review:      Results from last 7 days  Lab Units 18  0723 18  0606 18  0522 188   SODIUM mmol/L 137 137 137 137   POTASSIUM mmol/L 3.7 3.3* 3.6 3.6   CHLORIDE mmol/L 103 101 104 103   CO2 mmol/L 26.0 26.0 24.0 22.0   BUN mg/dL 25* 26* 21 23*   CREATININE mg/dL 1.30 1.41* 1.21 1.31*   CALCIUM mg/dL 9.2 9.3 9.2 9.1   BILIRUBIN mg/dL  --   --   --  0.5   ALK PHOS U/L  --   --   --  78   ALT (SGPT) U/L  --   --   --  42   AST (SGOT) U/L  --   --   --  29   GLUCOSE mg/dL 134* 139* 144* 115*       Estimated Creatinine Clearance: 65 mL/min (by C-G formula based on " Cr of 1.3).                  Results from last 7 days  Lab Units 01/25/18  0723 01/24/18  0606 01/23/18  0522 01/22/18 2038   WBC 10*3/mm3 8.82 8.69 11.10* 9.26   HEMOGLOBIN g/dL 14.7 15.1 14.5 15.3   PLATELETS 10*3/mm3 239 242 234 232         Results from last 7 days  Lab Units 01/22/18 2038   INR  0.90         Imaging Results (last 24 hours)     Procedure Component Value Units Date/Time    US Renal Artery Complete [[id number]] Collected:  01/24/18 0840     Updated:  01/24/18 1813    Narrative:         EXAMINATION:  ultrasound, retroperitoneal / renal       CLINICAL INDICATION:  resistant HTN, R07.2 Precordial pain I10  Essential (primary) hypertension    HISTORY:  COMPARISON:  none      TECHNIQUE:  ultrasound, renal, standard protocol , with color  flow and pulse wave Doppler           FINDINGS:             Kidney, right:         size:  normal measuring 13.5 x 6.3 x 6.5 cm        echotexture:  normal            misc.:  no nephrolithiasis, solid mass, or collecting system  dilation    Doppler evaluation: Proximal: 118/35, mid: 124/35, distal:  93/31 cm/s    Kidney, left:         size:  normal measuring 11.3 x 6.0 x 5.9 cm        echotexture:  normal          misc.:  no nephrolithiasis, solid mass, or collecting system  dilation  Doppler evaluation: Proximal: 121/41, mid: 160/57, distal: 147/44  cm/s       Urinary bladder:  Not evaluated due to nondistended state.               Misc:      .      Impression:       CONCLUSION:    1.  Negative examination.     Consider a nuclear medicine captopril study as is deemed  clinically appropriate.    Electronically signed by:  SALLIE Blair MD  1/24/2018 6:12  PM CST Workstation: [id number]    US Renal Bilateral [[id number]] Collected:  01/24/18 0840     Updated:  01/24/18 1813    Narrative:         EXAMINATION:  ultrasound, retroperitoneal / renal       CLINICAL INDICATION:  resistant HTN, R07.2 Precordial pain I10  Essential (primary) hypertension    HISTORY:  COMPARISON:   none      TECHNIQUE:  ultrasound, renal, standard protocol , with color  flow and pulse wave Doppler           FINDINGS:             Kidney, right:         size:  normal measuring 13.5 x 6.3 x 6.5 cm        echotexture:  normal            misc.:  no nephrolithiasis, solid mass, or collecting system  dilation    Doppler evaluation: Proximal: 118/35, mid: 124/35, distal:  93/31 cm/s    Kidney, left:         size:  normal measuring 11.3 x 6.0 x 5.9 cm        echotexture:  normal          misc.:  no nephrolithiasis, solid mass, or collecting system  dilation  Doppler evaluation: Proximal: 121/41, mid: 160/57, distal: 147/44  cm/s       Urinary bladder:  Not evaluated due to nondistended state.               Misc:      .      Impression:       CONCLUSION:    1.  Negative examination.     Consider a nuclear medicine captopril study as is deemed  clinically appropriate.    Electronically signed by:  SALLIE Blair MD  1/24/2018 6:12  PM CST Workstation: [id number]           MEDICATIONS      aspirin 81 mg Oral Daily   busPIRone 15 mg Oral Q12H   carvedilol 25 mg Oral BID With Meals   CloNIDine 0.2 mg Oral Q8H   docusate sodium 100 mg Oral BID   famotidine 40 mg Oral Daily   hydrALAZINE 75 mg Oral TID   lisinopril 40 mg Oral Q24H   ondansetron      traZODone 50 mg Oral Nightly          Assessment/Plan   ASSESSMENT / PLAN    Principal Problem:    Precordial pain  Active Problems:    Resistant hypertension    Sinus bradycardia    1.hypertension poorly controlled.  He has hypertensive urgency on arrival with symptoms of headache and shortness of air.  He also has diastolic dysfunction on the echocardiogram.  no [place] on duplex scan . Cr is better on lisinopril so doubt [place] or need of captopril scan but will observe closely. Ata and metanephrines are pending    Add dyazide, no potassium supplement is needed on discharge. F/u in office in 2 weeks    2- dyslipidemia add back lipitor 20mg hs                This document has been  electronically signed by Jasmina Lopez MD on January 25, 2018 12:09 PM

## 2018-01-25 NOTE — PLAN OF CARE
Problem: Patient Care Overview (Adult)  Goal: Plan of Care Review  Outcome: Ongoing (interventions implemented as appropriate)   01/25/18 0305   Coping/Psychosocial Response Interventions   Plan Of Care Reviewed With patient   Patient Care Overview   Progress improving   Outcome Evaluation   Outcome Summary/Follow up Plan continuing to monitor BP      Goal: Adult Individualization and Mutuality  Outcome: Ongoing (interventions implemented as appropriate)    Goal: Discharge Needs Assessment  Outcome: Ongoing (interventions implemented as appropriate)   01/23/18 0039 01/23/18 0041 01/25/18 0305   Discharge Needs Assessment   Discharge Disposition --  --  still a patient   Living Environment   Transportation Available --  car;family or friend will provide --    Self-Care   Equipment Currently Used at Home none --  --        Problem: Acute Coronary Syndrome (ACS) (Adult)  Goal: Signs and Symptoms of Listed Potential Problems Will be Absent or Manageable (Acute Coronary Syndrome)  Outcome: Ongoing (interventions implemented as appropriate)   01/25/18 0305   Acute Coronary Syndrome (ACS)   Problems Assessed (Acute Coronary Syndrome (ACS)) all   Problems Present (Acute Coronary Syndrome (ACS)) none

## 2018-01-25 NOTE — DISCHARGE SUMMARY
DISCHARGE SUMMARY    NAME: Huber Gomez   PHYSICIAN: Gerber Hess MD  : 1950  MRN: [id number]    ADMITTED: 2018   DISCHARGED:  18    ADMISSION DIAGNOSES:   Present on Admission:  • Precordial pain  • Resistant hypertension  • Sinus bradycardia    DISCHARGE DIAGNOSES:   Principal Problem:    Precordial pain  Active Problems:    Resistant hypertension    Sinus bradycardia      SERVICE: Medicine. Attending  Gerber Hess MD    CONSULTS:   Consult Orders (all)     Start     Ordered    18 1457  Inpatient Consult to Nephrology  Once     Specialty:  Nephrology  Provider:  Jasmina Lopez MD    18 1457    185  Hospitalist (on-call MD unless specified)  Once     Specialty:  Hospitalist  Provider:  Percy Nunez MD    18 2315          PROCEDURES:   Imaging Results (last 7 days)     Procedure Component Value Units Date/Time    XR Chest 2 View [[id number]] Collected:  18     Updated:  18    Narrative:       Exam: PA lateral chest    INDICATION: Chest pain    COMPARISON: 2015    FINDINGS: The bony structures are intact. The cardiomediastinal  silhouette is unremarkable. Aorta is tortuous. Lungs are clear.  No pneumothorax or pleural effusion.      Impression:       No acute cardiopulmonary abnormality.    Electronically signed by:  Asim Larsen MD  2018 9:55 PM  CST Workstation: [id number]    [employer] Renal Artery Complete [[id number]] Collected:  18 0840     Updated:  18 1813    Narrative:         EXAMINATION:  ultrasound, retroperitoneal / renal       CLINICAL INDICATION:  resistant HTN, R07.2 Precordial pain I10  Essential (primary) hypertension    HISTORY:  COMPARISON:  none      TECHNIQUE:  ultrasound, renal, standard protocol , with color  flow and pulse wave Doppler           FINDINGS:             Kidney, right:         size:  normal measuring 13.5 x 6.3 x 6.5 cm        echotexture:  normal            misc.:  no nephrolithiasis,  solid mass, or collecting system  dilation    Doppler evaluation: Proximal: 118/35, mid: 124/35, distal:  93/31 cm/s    Kidney, left:         size:  normal measuring 11.3 x 6.0 x 5.9 cm        echotexture:  normal          misc.:  no nephrolithiasis, solid mass, or collecting system  dilation  Doppler evaluation: Proximal: 121/41, mid: 160/57, distal: 147/44  cm/s       Urinary bladder:  Not evaluated due to nondistended state.               Misc:      .      Impression:       CONCLUSION:    1.  Negative examination.     Consider a nuclear medicine captopril study as is deemed  clinically appropriate.    Electronically signed by:  SALLIE Blair MD  1/24/2018 6:12  PM CST Workstation: [id number]    US Renal Bilateral [[id number]] Collected:  01/24/18 0840     Updated:  01/24/18 1813    Narrative:         EXAMINATION:  ultrasound, retroperitoneal / renal       CLINICAL INDICATION:  resistant HTN, R07.2 Precordial pain I10  Essential (primary) hypertension    HISTORY:  COMPARISON:  none      TECHNIQUE:  ultrasound, renal, standard protocol , with color  flow and pulse wave Doppler           FINDINGS:             Kidney, right:         size:  normal measuring 13.5 x 6.3 x 6.5 cm        echotexture:  normal            misc.:  no nephrolithiasis, solid mass, or collecting system  dilation    Doppler evaluation: Proximal: 118/35, mid: 124/35, distal:  93/31 cm/s    Kidney, left:         size:  normal measuring 11.3 x 6.0 x 5.9 cm        echotexture:  normal          misc.:  no nephrolithiasis, solid mass, or collecting system  dilation  Doppler evaluation: Proximal: 121/41, mid: 160/57, distal: 147/44  cm/s       Urinary bladder:  Not evaluated due to nondistended state.               Misc:      .      Impression:       CONCLUSION:    1.  Negative examination.     Consider a nuclear medicine captopril study as is deemed  clinically appropriate.    Electronically signed by:  SALLIE Blair MD  1/24/2018 6:12  PM CST  Workstation: [id number]          HISTORY OF PRESENT ILLNESS: *Copied from Percy Nunez MD's H&P*  Patient is a 67 years old male with PMH of HTN complains of uncontrolled BP at home for past 2 days. Patient states that he had similar problems in the past where he had to be hospitalized before. Patient is on multiple home medications. Patient felt some chest pressure on rest yesterday for a minute that resolved .Patient is currently chest pain free.  Patient denies fever or chills, headache , dizziness, visual changes, change in appetite , chest pain or palpitations, difficulty in breathing or cough, abdominal pain , N/V/D , blood in the stool or urine or urinary symptoms, weakness numbness or tingling in the extremities.    DIAGNOSTIC DATA:   Lab Results (last 7 days)     Procedure Component Value Units Date/Time    CBC & Differential [[id number]] Collected:  01/22/18 2038    Specimen:  Blood Updated:  01/22/18 2129    Narrative:       The following orders were created for panel order CBC & Differential.  Procedure                               Abnormality         Status                     ---------                               -----------         ------                     CBC Auto Differential[[id number]]        Abnormal            Final result                 Please view results for these tests on the individual orders.    CBC Auto Differential [[id number]]  (Abnormal) Collected:  01/22/18 2038    Specimen:  Blood Updated:  01/22/18 2129     WBC 9.26 10*3/mm3      RBC 5.29 10*6/mm3      Hemoglobin 15.3 g/dL      Hematocrit 44.2 %      MCV 83.6 fL      MCH 28.9 pg      MCHC 34.6 g/dL      RDW 12.9 %      RDW-SD 38.7 fl      MPV 11.1 fL      Platelets 232 10*3/mm3      Neutrophil % 55.7 %      Lymphocyte % 30.5 %      Monocyte % 10.5 %      Eosinophil % 2.3 %      Basophil % 0.5 %      Immature Grans % 0.5 %      Neutrophils, Absolute 5.16 10*3/mm3      Lymphocytes, Absolute 2.82 10*3/mm3      Monocytes, Absolute 0.97  (H) 10*3/mm3      Eosinophils, Absolute 0.21 10*3/mm3      Basophils, Absolute 0.05 10*3/mm3      Immature Grans, Absolute 0.05 (H) 10*3/mm3     Protime-INR [[id number]]  (Normal) Collected:  01/22/18 2038    Specimen:  Blood Updated:  01/22/18 2131     Protime 12.0 Seconds      INR 0.90    Narrative:       Therapeutic range for most indications is 2.0-3.0 INR,  or 2.5-3.5 for mechanical heart valves.    CK [[id number]]  (Normal) Collected:  01/22/18 2038    Specimen:  Blood Updated:  01/22/18 2135     Creatine Kinase 91 U/L     Lipase [[id number]]  (Normal) Collected:  01/22/18 2038    Specimen:  Blood Updated:  01/22/18 2135     Lipase 184 U/L     Comprehensive Metabolic Panel [[id number]]  (Abnormal) Collected:  01/22/18 2038    Specimen:  Blood Updated:  01/22/18 2137     Glucose 115 (H) mg/dL      BUN 23 (H) mg/dL      Creatinine 1.31 (H) mg/dL      Sodium 137 mmol/L      Potassium 3.6 mmol/L      Chloride 103 mmol/L      CO2 22.0 mmol/L      Calcium 9.1 mg/dL      Total Protein 7.4 g/dL      Albumin 4.10 g/dL      ALT (SGPT) 42 U/L      AST (SGOT) 29 U/L      Alkaline Phosphatase 78 U/L      Total Bilirubin 0.5 mg/dL      eGFR Non African Amer 55 (L) mL/min/1.73      Globulin 3.3 gm/dL      A/G Ratio 1.2 g/dL      BUN/Creatinine Ratio 17.6     Anion Gap 12.0 mmol/L     [facility] Draw [[id number]] Collected:  01/22/18 2038    Specimen:  Blood Updated:  01/22/18 2146    Narrative:       The following orders were created for panel order [facility] Draw.  Procedure                               Abnormality         Status                     ---------                               -----------         ------                     Light Blue Top[[id number]]                                   Final result               Green Top (Gel)[[id number]]                                  Final result               Lavender Top[[id number]]                                     Final result               Gold Top - SST[[id number]]                                    Final result                 Please view results for these tests on the individual orders.    Light Blue Top [[id number]] Collected:  01/22/18 2038    Specimen:  Blood Updated:  01/22/18 2146     Extra Tube hold for add-on      Auto resulted       Green Top (Gel) [[id number]] Collected:  01/22/18 2038    Specimen:  Blood Updated:  01/22/18 2146     Extra Tube Hold for add-ons.      Auto resulted.       Lavender Top [[id number]] Collected:  01/22/18 2038    Specimen:  Blood Updated:  01/22/18 2146     Extra Tube hold for add-on      Auto resulted       Gold Top - SST [[id number]] Collected:  01/22/18 2038    Specimen:  Blood Updated:  01/22/18 2146     Extra Tube Hold for add-ons.      Auto resulted.       Troponin [[id number]]  (Normal) Collected:  01/22/18 2038    Specimen:  Blood Updated:  01/22/18 2147     Troponin I 0.023 ng/mL     BNP [[id number]]  (Normal) Collected:  01/22/18 2038    Specimen:  Blood Updated:  01/22/18 2147     proBNP 272.0 pg/mL     CK-MB [[id number]]  (Normal) Collected:  01/22/18 2038    Specimen:  Blood Updated:  01/22/18 2147     CKMB 1.72 ng/mL     CBC & Differential [[id number]] Collected:  01/23/18 0522    Specimen:  Blood Updated:  01/23/18 0555    Narrative:       The following orders were created for panel order CBC & Differential.  Procedure                               Abnormality         Status                     ---------                               -----------         ------                     CBC Auto Differential[[id number]]        Abnormal            Final result                 Please view results for these tests on the individual orders.    CBC Auto Differential [[id number]]  (Abnormal) Collected:  01/23/18 0522    Specimen:  Blood Updated:  01/23/18 0555     WBC 11.10 (H) 10*3/mm3      RBC 5.12 10*6/mm3      Hemoglobin 14.5 g/dL      Hematocrit 42.8 %      MCV 83.6 fL      MCH 28.3 pg      MCHC 33.9 g/dL      RDW 12.9 %      RDW-SD 39.0 fl      MPV 10.9 fL       Platelets 234 10*3/mm3      Neutrophil % 76.9 %      Lymphocyte % 14.1 %      Monocyte % 7.7 %      Eosinophil % 0.6 %      Basophil % 0.2 %      Immature Grans % 0.5 %      Neutrophils, Absolute 8.54 10*3/mm3      Lymphocytes, Absolute 1.56 10*3/mm3      Monocytes, Absolute 0.86 10*3/mm3      Eosinophils, Absolute 0.07 10*3/mm3      Basophils, Absolute 0.02 10*3/mm3      Immature Grans, Absolute 0.05 (H) 10*3/mm3     Basic Metabolic Panel [[id number]]  (Abnormal) Collected:  01/23/18 0522    Specimen:  Blood Updated:  01/23/18 0619     Glucose 144 (H) mg/dL      BUN 21 mg/dL      Creatinine 1.21 mg/dL      Sodium 137 mmol/L      Potassium 3.6 mmol/L      Chloride 104 mmol/L      CO2 24.0 mmol/L      Calcium 9.2 mg/dL      eGFR Non African Amer 60 mL/min/1.73      BUN/Creatinine Ratio 17.4     Anion Gap 9.0 mmol/L     Extra Tubes [[id number]] Collected:  01/23/18 0522    Specimen:  Blood from Blood, Venous Line Updated:  01/23/18 0631    Narrative:       The following orders were created for panel order Extra Tubes.  Procedure                               Abnormality         Status                     ---------                               -----------         ------                     Gold Top - SST[[id number]]                                   Final result                 Please view results for these tests on the individual orders.    Gold Top - SST [[id number]] Collected:  01/23/18 0522    Specimen:  Blood Updated:  01/23/18 0631     Extra Tube Hold for add-ons.      Auto resulted.       Troponin [[id number]]  (Normal) Collected:  01/23/18 0522    Specimen:  Blood Updated:  01/23/18 0635     Troponin I 0.019 ng/mL     CBC & Differential [[id number]] Collected:  01/24/18 0606    Specimen:  Blood Updated:  01/24/18 0651    Narrative:       The following orders were created for panel order CBC & Differential.  Procedure                               Abnormality         Status                     ---------                                -----------         ------                     CBC Auto Differential[[id number]]        Abnormal            Final result                 Please view results for these tests on the individual orders.    CBC Auto Differential [[id number]]  (Abnormal) Collected:  01/24/18 0606    Specimen:  Blood Updated:  01/24/18 0651     WBC 8.69 10*3/mm3      RBC 5.39 10*6/mm3      Hemoglobin 15.1 g/dL      Hematocrit 45.5 %      MCV 84.4 fL      MCH 28.0 pg      MCHC 33.2 g/dL      RDW 13.2 %      RDW-SD 40.1 fl      MPV 11.0 fL      Platelets 242 10*3/mm3      Neutrophil % 59.1 %      Lymphocyte % 26.2 %      Monocyte % 11.6 %      Eosinophil % 2.0 %      Basophil % 0.6 %      Immature Grans % 0.5 %      Neutrophils, Absolute 5.14 10*3/mm3      Lymphocytes, Absolute 2.28 10*3/mm3      Monocytes, Absolute 1.01 (H) 10*3/mm3      Eosinophils, Absolute 0.17 10*3/mm3      Basophils, Absolute 0.05 10*3/mm3      Immature Grans, Absolute 0.04 (H) 10*3/mm3     Basic Metabolic Panel [[id number]]  (Abnormal) Collected:  01/24/18 0606    Specimen:  Blood Updated:  01/24/18 0714     Glucose 139 (H) mg/dL      BUN 26 (H) mg/dL      Creatinine 1.41 (H) mg/dL      Sodium 137 mmol/L      Potassium 3.3 (L) mmol/L      Chloride 101 mmol/L      CO2 26.0 mmol/L      Calcium 9.3 mg/dL      eGFR Non African Amer 50 mL/min/1.73      BUN/Creatinine Ratio 18.4     Anion Gap 10.0 mmol/L     Protein / Creatinine Ratio, Urine - Urine, Clean Catch [[id number]] Collected:  01/24/18 1714    Specimen:  Urine from Urine, Clean Catch Updated:  01/24/18 1754     Protein/Creatinine Ratio, Urine 147.8 mg/G Crea      Creatinine, Urine 81.2 mg/dL      Total Protein, Urine 12.0 mg/dL     Aldosterone / Renin Ratio [[id number]] Collected:  01/25/18 0723    Specimen:  Blood Updated:  01/25/18 0727    Catecholamines, Fractionated, Plasma [[id number]] Collected:  01/25/18 0723    Specimen:  Blood Updated:  01/25/18 0728    CBC & Differential [[id number]]  Collected:  01/25/18 0723    Specimen:  Blood Updated:  01/25/18 0732    Narrative:       The following orders were created for panel order CBC & Differential.  Procedure                               Abnormality         Status                     ---------                               -----------         ------                     CBC Auto Differential[[id number]]        Abnormal            Final result                 Please view results for these tests on the individual orders.    CBC Auto Differential [[id number]]  (Abnormal) Collected:  01/25/18 0723    Specimen:  Blood Updated:  01/25/18 0732     WBC 8.82 10*3/mm3      RBC 5.23 10*6/mm3      Hemoglobin 14.7 g/dL      Hematocrit 44.1 %      MCV 84.3 fL      MCH 28.1 pg      MCHC 33.3 g/dL      RDW 13.2 %      RDW-SD 40.0 fl      MPV 10.5 fL      Platelets 239 10*3/mm3      Neutrophil % 58.1 %      Lymphocyte % 28.5 %      Monocyte % 10.1 %      Eosinophil % 2.2 %      Basophil % 0.5 %      Immature Grans % 0.6 (H) %      Neutrophils, Absolute 5.14 10*3/mm3      Lymphocytes, Absolute 2.51 10*3/mm3      Monocytes, Absolute 0.89 10*3/mm3      Eosinophils, Absolute 0.19 10*3/mm3      Basophils, Absolute 0.04 10*3/mm3      Immature Grans, Absolute 0.05 (H) 10*3/mm3     Basic Metabolic Panel [[id number]]  (Abnormal) Collected:  01/25/18 0723    Specimen:  Blood Updated:  01/25/18 0759     Glucose 134 (H) mg/dL      BUN 25 (H) mg/dL      Creatinine 1.30 mg/dL      Sodium 137 mmol/L      Potassium 3.7 mmol/L      Chloride 103 mmol/L      CO2 26.0 mmol/L      Calcium 9.2 mg/dL      eGFR Non African Amer 55 mL/min/1.73      BUN/Creatinine Ratio 19.2     Anion Gap 8.0 mmol/L     Lipid Panel [[id number]]  (Abnormal) Collected:  01/25/18 0723    Specimen:  Blood Updated:  01/25/18 0810     Total Cholesterol 205 (H) mg/dL      Triglycerides 239 (H) mg/dL      HDL Cholesterol 34 (L) mg/dL      LDL Cholesterol  126 mg/dL      LDL/HDL Ratio 3.62 (H)          HOSPITAL  "COURSE:  Active Hospital Problems (** Indicates Principal Problem)    Diagnosis Date Noted   • **Precordial pain [R07.2] 01/22/2018   • Resistant hypertension [I10] 01/23/2018   • Sinus bradycardia [R00.1] 01/23/2018      Resolved Hospital Problems    Diagnosis Date Noted Date Resolved   No resolved problems to display.     1. Chest pain.    1/25. Chest pain free today.   1/24. Patient chest pain free. Renal artery workup pending.  1/23. Per patient chest pain resolved with decrease in blood pressure.  Currently chest pain free.  Enzymes negative x2. EKG reviewed. ECHO ordered today.  2. Resistant HTN.   1/25. Added dyazide. Follow up with nephrology.  1/24. Renal artery workup pending. Nephrology consulted.  Appreciate input.  1/23. Renal artery ultrasound ordered.  Increased PO hydralazine to 75 tid from 50 tid.  3. Sinus bradycardia.   1/24 - 1/25. Asymptomatic. Monitor.    PHYSICAL EXAM ON DISCHARGE:  /84 (BP Location: Right arm, Patient Position: Lying)  Pulse 55  Temp 97.9 °F (36.6 °C) (Temporal Artery )   Resp 18  Ht 172.2 cm (67.8\")  Wt 101 kg (223 lb)  SpO2 98%  BMI 34.11 kg/m2     Physical Exam   Constitutional: He is oriented to person, place, and time. He appears well-developed and well-nourished. No distress.   HENT:   Head: Normocephalic and atraumatic.   Right Ear: External ear normal.   Left Ear: External ear normal.   Nose: Nose normal.   Eyes: Conjunctivae and EOM are normal. Pupils are equal, round, and reactive to light.   Neck: Normal range of motion. Neck supple.   Cardiovascular: Normal rate, regular rhythm, normal heart sounds and intact distal pulses.  Exam reveals no gallop and no friction rub.    No murmur heard.  Pulmonary/Chest: Effort normal and breath sounds normal. No respiratory distress. He has no wheezes. He has no rales. He exhibits no tenderness.   Abdominal: Soft. Bowel sounds are normal. He exhibits no distension and no mass. There is no tenderness. There is no " rebound and no guarding.   Musculoskeletal: Normal range of motion.   Neurological: He is alert and oriented to person, place, and time.   Skin: Skin is warm and dry. No rash noted. He is not diaphoretic. No erythema. No pallor.   Psychiatric: He has a normal mood and affect. His behavior is normal.   Nursing note and vitals reviewed.      CONDITION ON DISCHARGE:   Stable    Review of Systems   Constitutional: Negative for activity change, appetite change, fatigue and fever.   HENT: Negative for ear pain and sore throat.    Eyes: Negative for pain and visual disturbance.   Respiratory: Negative for cough and shortness of breath.    Cardiovascular: Negative for chest pain and palpitations.   Gastrointestinal: Negative for abdominal pain and nausea.   Endocrine: Negative for cold intolerance and heat intolerance.   Genitourinary: Negative for difficulty urinating and dysuria.   Musculoskeletal: Negative for arthralgias and gait problem.   Skin: Negative for color change and rash.   Neurological: Negative for dizziness, weakness and headaches.   Hematological: Negative for adenopathy. Does not bruise/bleed easily.   Psychiatric/Behavioral: Negative for agitation, confusion and sleep disturbance.     DISPOSITION:  Home or Self Care    DISCHARGE MEDICATIONS   Huber Goemz   Home Medication Instructions SIVA:[id number]    Printed on:01/25/18 140   Medication Information                      alfuzosin (UROXATRAL) 10 MG 24 hr tablet  Take 10 mg by mouth Daily.             ASPIRIN PO  Take 81 mg by mouth.             atorvastatin (LIPITOR) 20 MG tablet  Take 1 tablet by mouth Daily.             busPIRone (BUSPAR) 15 MG tablet  Take 15 mg by mouth every night at bedtime.             carvedilol (COREG) 25 MG tablet  Take 25 mg by mouth 2 (Two) Times a Day With Meals.             cholecalciferol (VITAMIN D3) 1000 units tablet  Take 1,000 Units by mouth Daily.             CloNIDine (CATAPRES) 0.2 MG tablet                coenzyme Q10 100 MG capsule  Take 100 mg by mouth Daily.             hydrALAZINE (APRESOLINE) 50 MG tablet  Take 50 mg by mouth 3 (Three) Times a Day.             lisinopril (PRINIVIL,ZESTRIL) 40 MG tablet  Take 20 mg by mouth.             magnesium citrate 1.745 GM/30ML solution solution  Take 325 mL by mouth 2 (Two) Times a Day.             Mirabegron ER (MYRBETRIQ) 50 MG tablet sustained-release 24 hour 24 hr tablet  Take 50 mg by mouth Daily.             traZODone (DESYREL) 50 MG tablet  Take 50 mg by mouth Every Night.             triamterene-hydrochlorothiazide (DYAZIDE) 37.5-25 MG per capsule  Take 1 capsule by mouth Daily.                 INSTRUCTIONS:  Activity:   Activity Instructions     Activity as Tolerated                   Diet:   Diet Instructions     Advance Diet As Tolerated                     Special instructions: Patient instructed to call MD or return to ED with worsening shortness of breath, chest pain, fever greater than 100.4 degrees F or any other medical concerns..    FOLLOW UP:   Additional Instructions for the Follow-ups that You Need to Schedule     Basic Metabolic Panel    Feb 05, 2018 (Approximate)              Follow-up Information     Follow up with Jasmina Lopez MD Follow up in 2 week(s).    Specialty:  Nephrology    Contact information:    [address] [place] [id number] [phone]          Follow up with Justine Tirado MD .    Specialty:  Family Medicine    Contact information:    [address] [facility] [address]  [phone]            PENDING TEST RESULTS AT DISCHARGE   Order Current Status    Aldosterone / Renin Ratio In process    Catecholamines, Fractionated, Plasma In process          Time: Discharge 30 min          This document has been electronically signed by Gerber Hess MD on January 25, 2018 2:02 PM

## 2018-01-25 NOTE — CONSULTS
[facility]  [address] [place] [address]. [id number]    [phone]    [phone]     Consultation         PATIENT  DEMOGRAPHICS   PATIENT NAME: Huber Gomez                      PHYSICIAN: Jasmina Lopez MD  : 1950  MRN: [id number]    Subjective   SUBJECTIVE   Referring Provider: Dr Hess  Reason for Consultation: Uncontrolled HTN  History of present illness:      Mr. Gomez is a 67-year-old gentleman who has past medical history of hypertension for nearly 20 years.  It is mildly elevated at home on multiple antihypertensives. It is in 150-160 range.  He's currently on coreg clonidine hydralazine hydrochlorothiazide and lisinopril.  He came in with sudden onset of elevated blood pressure along with headache and shortness of air.  According to him this has happened in the past as well.    On arrival here patient blood pressure was 249/114.  His heart rate was also on the low side in the mid 40s.  We've been asked to evaluate for poorly controlled blood pressure.  Patient doesn't have any long-standing history of smoking.  He doesn't have any peripheral arterial disease or coronary artery disease.  He has mildly elevated creatinine to 1.4 and therefore the hydrochlorothiazide has been stopped.  His hydralazine is optimized and his blood pressure is much better.  His echocardiogram showed LVH.    Past Medical History:   Diagnosis Date   • Hypertension      History reviewed. No pertinent surgical history.  History reviewed. No pertinent family history.  Social History   Substance Use Topics   • Smoking status: Never Smoker   • Smokeless tobacco: Never Used   • Alcohol use No     Allergies:  Review of patient's allergies indicates no known allergies.     REVIEW OF SYSTEMS    Review of Systems   Constitutional: Negative for chills and fever.   Respiratory: Positive for shortness of breath. Negative for chest tightness.    Cardiovascular: Negative for chest pain and leg swelling.  "  Gastrointestinal: Negative for abdominal pain, diarrhea and nausea.   Genitourinary: Negative for dysuria, flank pain and hematuria.   Neurological: Positive for headaches. Negative for dizziness, syncope and weakness.       Objective   OBJECTIVE   Vital Signs  Temp:  [97.9 °F (36.6 °C)-98.9 °F (37.2 °C)] 97.9 °F (36.6 °C)  Heart Rate:  [52-67] 56  Resp:  [18-20] 18  BP: (122-180)/(72-94) 164/80    Flowsheet Rows         First Filed Value    Admission Height  172.7 cm (68\") Documented at 01/22/2018 1859    Admission Weight  104 kg (230 lb) Documented at 01/22/2018 1859           I/O last 3 completed shifts:  In: 480 [P.O.:480]  Out: -     PHYSICAL EXAM    Physical Exam   Constitutional: He is oriented to person, place, and time. He appears well-developed.   HENT:   Head: Normocephalic.   Eyes: Pupils are equal, round, and reactive to light.   Cardiovascular: Normal rate, regular rhythm and normal heart sounds.    Pulmonary/Chest: Effort normal and breath sounds normal. He has no wheezes. He has no rales.   Abdominal: Soft. Bowel sounds are normal.   Musculoskeletal: He exhibits no edema.   Neurological: He is alert and oriented to person, place, and time.       RESULTS   Results Review:      Results from last 7 days  Lab Units 01/24/18  0606 01/23/18 0522 01/22/18  2038   SODIUM mmol/L 137 137 137   POTASSIUM mmol/L 3.3* 3.6 3.6   CHLORIDE mmol/L 101 104 103   CO2 mmol/L 26.0 24.0 22.0   BUN mg/dL 26* 21 23*   CREATININE mg/dL 1.41* 1.21 1.31*   CALCIUM mg/dL 9.3 9.2 9.1   BILIRUBIN mg/dL  --   --  0.5   ALK PHOS U/L  --   --  78   ALT (SGPT) U/L  --   --  42   AST (SGOT) U/L  --   --  29   GLUCOSE mg/dL 139* 144* 115*       Estimated Creatinine Clearance: 59.6 mL/min (by C-G formula based on Cr of 1.41).                  Results from last 7 days  Lab Units 01/24/18  0606 01/23/18  0522 01/22/18  2038   WBC 10*3/mm3 8.69 11.10* 9.26   HEMOGLOBIN g/dL 15.1 14.5 15.3   PLATELETS 10*3/mm3 242 234 232         Results " from last 7 days  Lab Units 01/22/18 2038   INR  0.90        MEDICATIONS      aspirin 81 mg Oral Daily   busPIRone 15 mg Oral Q12H   carvedilol 25 mg Oral BID With Meals   CloNIDine 0.2 mg Oral Q8H   docusate sodium 100 mg Oral BID   famotidine 40 mg Oral Daily   hydrALAZINE 75 mg Oral TID   lisinopril 40 mg Oral Q24H   ondansetron      traZODone 50 mg Oral Nightly        Prescriptions Prior to Admission   Medication Sig Dispense Refill Last Dose   • alfuzosin (UROXATRAL) 10 MG 24 hr tablet Take 10 mg by mouth Daily.   7/22/2017 at 0800   • ASPIRIN PO Take 81 mg by mouth.   7/21/2017 at 2000   • atorvastatin (LIPITOR) 20 MG tablet Take  by mouth Daily.   7/22/2017 at 0800   • busPIRone (BUSPAR) 15 MG tablet Take 15 mg by mouth every night at bedtime.   7/21/2017 at 2000   • carvedilol (COREG) 25 MG tablet Take 25 mg by mouth 2 (Two) Times a Day With Meals.   7/22/2017 at 0800   • cholecalciferol (VITAMIN D3) 1000 units tablet Take 1,000 Units by mouth Daily.      • CloNIDine (CATAPRES) 0.2 MG tablet       • coenzyme Q10 100 MG capsule Take 100 mg by mouth Daily.      • furosemide (LASIX) 20 MG tablet Take 20 mg by mouth Daily.   7/22/2017 at 0800   • gabapentin (NEURONTIN) 300 MG capsule Take 300 mg by mouth 3 (Three) Times a Day.   Taking   • hydrALAZINE (APRESOLINE) 50 MG tablet Take 50 mg by mouth 3 (Three) Times a Day.   7/22/2017 at 1300   • hydrochlorothiazide (HYDRODIURIL) 50 MG tablet Take 50 mg by mouth Daily.      • lisinopril (PRINIVIL,ZESTRIL) 40 MG tablet Take 20 mg by mouth.   7/22/2017 at 0800   • magnesium citrate 1.745 GM/30ML solution solution Take 325 mL by mouth 2 (Two) Times a Day.   7/22/2017 at 0800   • Mirabegron ER (MYRBETRIQ) 50 MG tablet sustained-release 24 hour 24 hr tablet Take 50 mg by mouth Daily.   7/22/2017 at 0800   • potassium chloride (K-DUR) 10 MEQ CR tablet Take 10 mEq by mouth Daily.   7/22/2017 at 0800   • traZODone (DESYREL) 50 MG tablet Take 50 mg by mouth Every Night.    7/21/2017 at 2000     Assessment/Plan   ASSESSMENT / PLAN    Principal Problem:    Precordial pain  Active Problems:    Resistant hypertension    Sinus bradycardia    1.hypertension poorly controlled.  He has hypertensive urgency on arrival with symptoms of headache and shortness of air.  He also has diastolic dysfunction on the echocardiogram.  I agree to rule out renal artery stenosis.  He does have dyslipidemia but he is not taking any statins at present.  There is no prior history of PAD or coronary artery disease.  He never smoked.    Agree with checking his renal artery Doppler to rule out renal artery stenosis.  If positive he will need CT angiogram.  If He has renal artery stenosis he will benefit with hydrochlorothiazide.  Continue with the lisinopril. We will also check a plasma renin activity and plasma aldosterone to rule out hyperaldosteronism since he has borderline low potassium.I'll also check fractionated serum metanephrines to rule out pheochromocytoma since it is paroxysmal in nature.    Thank you for the referral will continue follow the patient during the hospital stay.         I discussed the patients findings and my recommendations with patient         This document has been electronically signed by Jasmina Lopez MD on January 24, 2018 6:43 PM

## 2018-01-29 LAB
DOPAMINE SERPL-MCNC: <30 PG/ML (ref 0–48)
EPINEPH PLAS-MCNC: 16 PG/ML (ref 0–62)
NOREPINEPH PLAS-MCNC: 171 PG/ML (ref 0–874)

## 2018-01-31 LAB
ALDOST SERPL-MCNC: 12.1 NG/DL (ref 0–30)
ALDOST/RENIN PLAS-RTO: >72.5 {RATIO} (ref 0–30)
RENIN PLAS-CCNC: <0.167 NG/ML/HR (ref 0.17–5.38)

## 2018-02-19 ENCOUNTER — LAB (OUTPATIENT)
Dept: LAB | Facility: HOSPITAL | Age: 68
End: 2018-02-19

## 2018-02-19 DIAGNOSIS — I10 ESSENTIAL HYPERTENSION: ICD-10-CM

## 2018-02-19 DIAGNOSIS — I10 RESISTANT HYPERTENSION: ICD-10-CM

## 2018-02-19 LAB
ANION GAP SERPL CALCULATED.3IONS-SCNC: 14 MMOL/L (ref 5–15)
BUN BLD-MCNC: 24 MG/DL (ref 7–21)
BUN/CREAT SERPL: 17.9 (ref 7–25)
CALCIUM SPEC-SCNC: 9.4 MG/DL (ref 8.4–10.2)
CHLORIDE SERPL-SCNC: 101 MMOL/L (ref 95–110)
CO2 SERPL-SCNC: 26 MMOL/L (ref 22–31)
CREAT BLD-MCNC: 1.34 MG/DL (ref 0.7–1.3)
GFR SERPL CREATININE-BSD FRML MDRD: 53 ML/MIN/1.73 (ref 60–113)
GLUCOSE BLD-MCNC: 113 MG/DL (ref 60–100)
POTASSIUM BLD-SCNC: 4 MMOL/L (ref 3.5–5.1)
SODIUM BLD-SCNC: 141 MMOL/L (ref 137–145)

## 2018-02-19 PROCEDURE — 80048 BASIC METABOLIC PNL TOTAL CA: CPT

## 2018-02-19 PROCEDURE — 36415 COLL VENOUS BLD VENIPUNCTURE: CPT

## 2018-02-26 ENCOUNTER — APPOINTMENT (OUTPATIENT)
Dept: LAB | Facility: HOSPITAL | Age: 68
End: 2018-02-26

## 2018-02-26 ENCOUNTER — TRANSCRIBE ORDERS (OUTPATIENT)
Dept: LAB | Facility: HOSPITAL | Age: 68
End: 2018-02-26

## 2018-02-26 DIAGNOSIS — E78.5 HYPERLIPIDEMIA, UNSPECIFIED HYPERLIPIDEMIA TYPE: ICD-10-CM

## 2018-02-26 DIAGNOSIS — I10 ESSENTIAL (PRIMARY) HYPERTENSION: ICD-10-CM

## 2018-02-26 DIAGNOSIS — E26.9 HYPERALDOSTERONISM (HCC): Primary | ICD-10-CM

## 2018-02-26 LAB
COLLECT DURATION TIME UR: 24 HRS
COLLECT DURATION TIME UR: 24 HRS
POTASSIUM 24H UR-SRATE: 65.5 MMOL/24HRS (ref 25–125)
POTASSIUM UR-SCNC: 26.2 MMOL/L
SODIUM 24H UR-SRATE: 293 MMOL/24HRS (ref 40–220)
SODIUM UR-SCNC: 117 MMOL/L (ref 30–90)
SPECIMEN VOL 24H UR: 2500 ML
SPECIMEN VOL 24H UR: 2500 ML

## 2018-02-26 PROCEDURE — 81050 URINALYSIS VOLUME MEASURE: CPT | Performed by: INTERNAL MEDICINE

## 2018-02-26 PROCEDURE — 84300 ASSAY OF URINE SODIUM: CPT | Performed by: INTERNAL MEDICINE

## 2018-02-26 PROCEDURE — 84133 ASSAY OF URINE POTASSIUM: CPT | Performed by: INTERNAL MEDICINE

## 2018-02-26 PROCEDURE — 82088 ASSAY OF ALDOSTERONE: CPT | Performed by: INTERNAL MEDICINE

## 2018-03-04 LAB
ALDOST 24H UR-MRATE: 12.77 UG/24 HR (ref 0–19)
ALDOST UR-MCNC: 5.11 UG/L

## 2018-03-13 ENCOUNTER — HOSPITAL ENCOUNTER (OUTPATIENT)
Dept: CT IMAGING | Facility: HOSPITAL | Age: 68
Discharge: HOME | End: 2018-03-13
Admitting: INTERNAL MEDICINE

## 2018-03-13 DIAGNOSIS — E26.9 HYPERALDOSTERONISM (HCC): ICD-10-CM

## 2018-03-13 DIAGNOSIS — I10 HYPERTENSION, ESSENTIAL: ICD-10-CM

## 2018-03-13 DIAGNOSIS — E78.5 DYSLIPIDEMIA: ICD-10-CM

## 2018-03-13 PROCEDURE — 74170 CT ABD WO CNTRST FLWD CNTRST: CPT

## 2018-03-13 PROCEDURE — 0 DIATRIZOATE MEGLUMINE & SODIUM PER 1 ML: Performed by: INTERNAL MEDICINE

## 2018-03-13 PROCEDURE — 0 IOPAMIDOL 61 % SOLUTION: Performed by: INTERNAL MEDICINE

## 2018-03-13 RX ADMIN — IOPAMIDOL 90 ML: 612 INJECTION, SOLUTION INTRAVENOUS at 15:15

## 2018-03-13 RX ADMIN — DIATRIZOATE MEGLUMINE AND DIATRIZOATE SODIUM 20 ML: 660; 100 LIQUID ORAL; RECTAL at 15:11

## 2018-04-06 ENCOUNTER — TRANSCRIBE ORDERS (OUTPATIENT)
Dept: LAB | Facility: HOSPITAL | Age: 68
End: 2018-04-06

## 2018-04-06 ENCOUNTER — APPOINTMENT (OUTPATIENT)
Dept: LAB | Facility: HOSPITAL | Age: 68
End: 2018-04-06

## 2018-04-06 DIAGNOSIS — E26.9 HYPERALDOSTERONISM (HCC): Primary | ICD-10-CM

## 2018-04-06 LAB — CORTIS SERPL-MCNC: 1.54 MCG/DL (ref 4.46–22.7)

## 2018-04-06 PROCEDURE — 82533 TOTAL CORTISOL: CPT | Performed by: NURSE PRACTITIONER

## 2018-04-06 PROCEDURE — 36415 COLL VENOUS BLD VENIPUNCTURE: CPT | Performed by: NURSE PRACTITIONER

## 2018-04-06 PROCEDURE — 82088 ASSAY OF ALDOSTERONE: CPT | Performed by: NURSE PRACTITIONER

## 2018-04-06 PROCEDURE — 84244 ASSAY OF RENIN: CPT | Performed by: NURSE PRACTITIONER

## 2018-04-11 LAB
ALDOST SERPL-MCNC: 15.8 NG/DL (ref 0–30)
RENIN PLAS-CCNC: 0.3 NG/ML/HR (ref 0.17–5.38)

## 2018-04-16 ENCOUNTER — APPOINTMENT (OUTPATIENT)
Dept: LAB | Facility: HOSPITAL | Age: 68
End: 2018-04-16

## 2018-04-16 ENCOUNTER — TRANSCRIBE ORDERS (OUTPATIENT)
Dept: LAB | Facility: HOSPITAL | Age: 68
End: 2018-04-16

## 2018-04-16 DIAGNOSIS — E78.5 HYPERLIPIDEMIA, UNSPECIFIED HYPERLIPIDEMIA TYPE: ICD-10-CM

## 2018-04-16 DIAGNOSIS — E26.9 HYPERALDOSTERONISM (HCC): Primary | ICD-10-CM

## 2018-04-16 DIAGNOSIS — I10 ESSENTIAL (PRIMARY) HYPERTENSION: ICD-10-CM

## 2018-04-16 LAB
ANION GAP SERPL CALCULATED.3IONS-SCNC: 14 MMOL/L (ref 5–15)
BUN BLD-MCNC: 25 MG/DL (ref 7–21)
BUN/CREAT SERPL: 19.1 (ref 7–25)
CALCIUM SPEC-SCNC: 9.1 MG/DL (ref 8.4–10.2)
CHLORIDE SERPL-SCNC: 96 MMOL/L (ref 95–110)
CO2 SERPL-SCNC: 25 MMOL/L (ref 22–31)
CREAT BLD-MCNC: 1.31 MG/DL (ref 0.7–1.3)
GFR SERPL CREATININE-BSD FRML MDRD: 55 ML/MIN/1.73 (ref 49–113)
GLUCOSE BLD-MCNC: 234 MG/DL (ref 60–100)
MAGNESIUM SERPL-MCNC: 2 MG/DL (ref 1.6–2.3)
POTASSIUM BLD-SCNC: 4 MMOL/L (ref 3.5–5.1)
SODIUM BLD-SCNC: 135 MMOL/L (ref 137–145)

## 2018-04-16 PROCEDURE — 80048 BASIC METABOLIC PNL TOTAL CA: CPT | Performed by: INTERNAL MEDICINE

## 2018-04-16 PROCEDURE — 83735 ASSAY OF MAGNESIUM: CPT | Performed by: INTERNAL MEDICINE

## 2018-04-16 PROCEDURE — 36415 COLL VENOUS BLD VENIPUNCTURE: CPT | Performed by: INTERNAL MEDICINE

## 2018-07-09 ENCOUNTER — LAB (OUTPATIENT)
Dept: LAB | Facility: HOSPITAL | Age: 68
End: 2018-07-09

## 2018-07-09 ENCOUNTER — TRANSCRIBE ORDERS (OUTPATIENT)
Dept: LAB | Facility: HOSPITAL | Age: 68
End: 2018-07-09

## 2018-07-09 DIAGNOSIS — I10 ESSENTIAL HYPERTENSION: ICD-10-CM

## 2018-07-09 DIAGNOSIS — Q61.02 MULTIPLE RENAL CYSTS: ICD-10-CM

## 2018-07-09 DIAGNOSIS — E26.9 HYPERALDOSTERONISM (HCC): ICD-10-CM

## 2018-07-09 DIAGNOSIS — E78.5 HYPERLIPIDEMIA, UNSPECIFIED HYPERLIPIDEMIA TYPE: ICD-10-CM

## 2018-07-09 DIAGNOSIS — E26.9 HYPERALDOSTERONISM (HCC): Primary | ICD-10-CM

## 2018-07-09 LAB
ALBUMIN SERPL-MCNC: 4.6 G/DL (ref 3.4–4.8)
ANION GAP SERPL CALCULATED.3IONS-SCNC: 13 MMOL/L (ref 5–15)
BUN BLD-MCNC: 24 MG/DL (ref 7–21)
BUN/CREAT SERPL: 18.9 (ref 7–25)
CALCIUM SPEC-SCNC: 9.6 MG/DL (ref 8.4–10.2)
CHLORIDE SERPL-SCNC: 96 MMOL/L (ref 95–110)
CO2 SERPL-SCNC: 27 MMOL/L (ref 22–31)
CREAT BLD-MCNC: 1.27 MG/DL (ref 0.7–1.3)
CREAT UR-MCNC: 127 MG/DL
GFR SERPL CREATININE-BSD FRML MDRD: 57 ML/MIN/1.73 (ref 49–113)
GLUCOSE BLD-MCNC: 170 MG/DL (ref 60–100)
HCT VFR BLD AUTO: 44.2 % (ref 39–49)
HGB BLD-MCNC: 15.2 G/DL (ref 13.7–17.3)
MAGNESIUM SERPL-MCNC: 2 MG/DL (ref 1.6–2.3)
PHOSPHATE SERPL-MCNC: 3.4 MG/DL (ref 2.4–4.4)
POTASSIUM BLD-SCNC: 4.1 MMOL/L (ref 3.5–5.1)
PROT UR-MCNC: 10.1 MG/DL
PROT/CREAT UR: 79.5 MG/G CREA (ref 0–200)
SODIUM BLD-SCNC: 136 MMOL/L (ref 137–145)

## 2018-07-09 PROCEDURE — 82570 ASSAY OF URINE CREATININE: CPT

## 2018-07-09 PROCEDURE — 85014 HEMATOCRIT: CPT

## 2018-07-09 PROCEDURE — 36415 COLL VENOUS BLD VENIPUNCTURE: CPT

## 2018-07-09 PROCEDURE — 85018 HEMOGLOBIN: CPT

## 2018-07-09 PROCEDURE — 83735 ASSAY OF MAGNESIUM: CPT

## 2018-07-09 PROCEDURE — 84156 ASSAY OF PROTEIN URINE: CPT

## 2018-07-09 PROCEDURE — 80069 RENAL FUNCTION PANEL: CPT
